# Patient Record
Sex: FEMALE | Race: WHITE | NOT HISPANIC OR LATINO | Employment: FULL TIME | URBAN - METROPOLITAN AREA
[De-identification: names, ages, dates, MRNs, and addresses within clinical notes are randomized per-mention and may not be internally consistent; named-entity substitution may affect disease eponyms.]

---

## 2017-01-23 ENCOUNTER — ALLSCRIPTS OFFICE VISIT (OUTPATIENT)
Dept: OTHER | Facility: OTHER | Age: 32
End: 2017-01-23

## 2017-07-21 ENCOUNTER — ALLSCRIPTS OFFICE VISIT (OUTPATIENT)
Dept: OTHER | Facility: OTHER | Age: 32
End: 2017-07-21

## 2017-07-21 PROCEDURE — G0145 SCR C/V CYTO,THINLAYER,RESCR: HCPCS | Performed by: FAMILY MEDICINE

## 2017-07-21 PROCEDURE — 87624 HPV HI-RISK TYP POOLED RSLT: CPT | Performed by: FAMILY MEDICINE

## 2017-07-23 ENCOUNTER — LAB REQUISITION (OUTPATIENT)
Dept: LAB | Facility: HOSPITAL | Age: 32
End: 2017-07-23
Payer: COMMERCIAL

## 2017-07-23 DIAGNOSIS — Z01.419 ENCOUNTER FOR GYNECOLOGICAL EXAMINATION WITHOUT ABNORMAL FINDING: ICD-10-CM

## 2017-07-26 LAB
HPV RRNA GENITAL QL NAA+PROBE: NORMAL
LAB AP GYN PRIMARY INTERPRETATION: NORMAL
Lab: NORMAL

## 2017-07-27 ENCOUNTER — GENERIC CONVERSION - ENCOUNTER (OUTPATIENT)
Dept: OTHER | Facility: OTHER | Age: 32
End: 2017-07-27

## 2017-12-22 ENCOUNTER — ALLSCRIPTS OFFICE VISIT (OUTPATIENT)
Dept: OTHER | Facility: OTHER | Age: 32
End: 2017-12-22

## 2017-12-23 NOTE — PROGRESS NOTES
Assessment   1  ADHD (attention deficit hyperactivity disorder) (314 01) (F90 9)    Plan   ADHD (attention deficit hyperactivity disorder)    · Amphetamine-Dextroamphetamine 30 MG Oral Tablet (Adderall); TAKE 1    TABLET DAILY AS DIRECTED    Discussion/Summary      Same justyn, no change at this time  Possible side effects of new medications were reviewed with the patient/guardian today  The treatment plan was reviewed with the patient/guardian  The patient/guardian understands and agrees with the treatment plan      Chief Complaint   Pt here for 6 month follow up      History of Present Illness   The patient is being seen for a routine clinic follow-up of and taking gingko which helped a little with focus attention deficit hyperactivity disorder  Symptoms:  restlessness-- and-- more irritable, but-- no short attention span,-- no forgetfulness-- and-- no disorganization  Associated symptoms:  anxiety  Review of Systems        Constitutional: not feeling poorly-- and-- not feeling tired  Cardiovascular: no chest pain  Respiratory: no shortness of breath  Neurological: no headache  Psychiatric: anxiety, but-- no sleep disturbances  Active Problems   1  ADHD (attention deficit hyperactivity disorder) (314 01) (F90 9)   2  Lipoma (214 9) (D17 9)   3  Migraine (346 90) (G43 909)   4  Urticaria (708 9) (L50 9)    Past Medical History      The active problems and past medical history were reviewed and updated today  Surgical History   1  History of Oral Surgery Tooth Extraction     The surgical history was reviewed and updated today  Family History   Mother    1  Family history of attention deficit hyperactivity disorder (V17 0) (Z81 8)  Father    2  Family history of alcohol abuse (V61 41) (Z81 1)  Brother    3  Family history of Drug abuse   4  Family history of alcohol abuse (V61 41) (Z81 1)   5  Family history of attention deficit hyperactivity disorder (V17 0) (Z81 8)   6  Family history of substance abuse (V17 0) (Z81 4)  Grandfather    7  Family history of malignant neoplasm (V16 9) (Z80 9)  Maternal Aunt    8  Family history of multiple sclerosis (V17 2) (Z82 0)  Uncle    9  Family history of alcohol abuse (V61 41) (Z81 1)   10  Family history of substance abuse (V17 0) (Z81 4)     The family history was reviewed and updated today  Social History    · Alcohol consumption two to four days per week (V49 89) (Z78 9)   · Always uses seat belt   · Caffeine use (V49 89) (F15 90)   · Employed   · Full-time employment   · Has no children   · Has Spiritism belief   · Denied: History of domestic violence   · Lives in private house   · Living alone (V60 3) (Z60 2)   · Never smoker   · No advance directives (V49 89) (Z78 9)   · No alcohol use   · No living will   · Primary language is [place]   · Sexual History: Has a single partner   · Single   · Supportive and safe  The social history was reviewed and updated today  Current Meds    1  Amphetamine-Dextroamphetamine 30 MG Oral Tablet; TAKE 1 TABLET DAILY AS     DIRECTED; Last Rx:30Nov2017 Ordered     The medication list was reviewed and updated today  Allergies   1  Iodinated Contrast Media  2  No Known Environmental Allergies   3  No Known Food Allergies    Vitals   Vital Signs    Recorded: [id number] 09:00AM   Temperature 97 5 F, Temporal   Heart Rate 78   Respiration 14   Systolic 827, Sitting   Diastolic 70, Sitting   Height 5 ft 6 in   Weight 165 lb 6 oz   BMI Calculated 26 69   BSA Calculated 1 84     Physical Exam        Constitutional      General appearance: No acute distress, well appearing and well nourished  Pulmonary      Auscultation of lungs: Clear to auscultation  Cardiovascular      Auscultation of heart: Normal rate and rhythm, normal S1 and S2, without murmurs  Lymphatic      Palpation of lymph nodes in neck: No lymphadenopathy         Psychiatric      Mood and affect: Normal  Signatures    Electronically signed by :  Marky Benitez MD; Dec 22 2017  9:22AM EST                       (Author)

## 2018-01-12 VITALS
WEIGHT: 183 LBS | HEIGHT: 66 IN | HEART RATE: 76 BPM | BODY MASS INDEX: 29.41 KG/M2 | DIASTOLIC BLOOD PRESSURE: 80 MMHG | SYSTOLIC BLOOD PRESSURE: 122 MMHG

## 2018-01-12 NOTE — RESULT NOTES
Verified Results  (1) THIN PREP PAP WITH IMAGING [id number] 02:49PM Naahi Finger     Test Name Result Flag Reference   LAB AP CASE REPORT (Report)     Gynecologic Cytology Report            Case: [id number]                  Authorizing Provider: Neha Mallory MD      Collected:      07/21/2017           First Screen:     MARYLIN Zavala   Received:      07/24/2017 1534        Specimen:  LIQUID-BASED PAP, SCREENING, Endocervical   HPV HIGH RISK RESULT (Report)     HPV, High Risk: HPV NEG, HPV16 NEG, HPV18 NEG      Other High Risk HPV Negative, HPV 16 Negative, HPV 18 Negative  HPV types: 16,18,31,33,35,39,45,51,52,56,58,59,66 and 68 DNA are undetectable or below the pre-set threshold  Roche???s FDA approved Griselda 4800 is utilized with strict adherence to the ???s instruction  manual to test for the presence of High-Risk HPV DNA, as well as HPV 16 and HPV 18  This instrument  has been validated by our laboratory and/or by the   A negative result does not preclude the presence of HPV infection because results depend on adequate  specimen collection, absence of inhibitors and sufficient DNA to be detected  Additionally, HPV negative  results are not intended to prevent women from proceeding to colposcopy if clinically warranted  Positive HPV test results indicate the presence of any one or more of the high risk types, but since patients  are often co-infected with low-risk types it does not rule out the presence of low-risk types in patients  with mixed infections  LAB AP GYN PRIMARY INTERPRETATION      Negative for intraepithelial lesion or malignancy  Electronically signed by MARYLIN Zavala on 7/26/2017 at 2:49 PM   LAB AP GYN SPECIMEN ADEQUACY      Satisfactory for evaluation  Endocervical/transformation zone component present     LAB AP GYN ADDITIONAL INFORMATION (Report)     Attributor's FDA approved ,  and ThinPrep Imaging System are   utilized with strict adherence to the 's instruction manual to   prepare gynecologic and non-gynecologic cytology specimens for the   production of ThinPrep slides as well as for gynecologic ThinPrep imaging  These processes have been validated by our laboratory and/or by the     The Pap test is not a diagnostic procedure and should not be used as the   sole means to detect cervical cancer  It is only a screening procedure to   aid in the detection of cervical cancer and its precursors  Both   false-negative and false-positive results have been experienced  Your   patient's test result should be interpreted in this context together with   the history and clinical findings

## 2018-01-14 VITALS
HEIGHT: 66 IN | DIASTOLIC BLOOD PRESSURE: 80 MMHG | TEMPERATURE: 97.7 F | SYSTOLIC BLOOD PRESSURE: 116 MMHG | HEART RATE: 77 BPM | WEIGHT: 162.8 LBS | BODY MASS INDEX: 26.16 KG/M2 | RESPIRATION RATE: 16 BRPM

## 2018-01-22 VITALS
BODY MASS INDEX: 26.58 KG/M2 | DIASTOLIC BLOOD PRESSURE: 70 MMHG | RESPIRATION RATE: 14 BRPM | HEIGHT: 66 IN | HEART RATE: 78 BPM | WEIGHT: 165.38 LBS | TEMPERATURE: 97.5 F | SYSTOLIC BLOOD PRESSURE: 110 MMHG

## 2018-03-02 DIAGNOSIS — F90.9 ATTENTION DEFICIT HYPERACTIVITY DISORDER (ADHD), UNSPECIFIED ADHD TYPE: Primary | ICD-10-CM

## 2018-03-02 RX ORDER — DEXTROAMPHETAMINE SACCHARATE, AMPHETAMINE ASPARTATE, DEXTROAMPHETAMINE SULFATE AND AMPHETAMINE SULFATE 7.5; 7.5; 7.5; 7.5 MG/1; MG/1; MG/1; MG/1
1 TABLET ORAL DAILY
Qty: 30 TABLET | Refills: 0 | Status: SHIPPED | OUTPATIENT
Start: 2018-03-02 | End: 2018-04-18 | Stop reason: SDUPTHER

## 2018-03-02 RX ORDER — DEXTROAMPHETAMINE SACCHARATE, AMPHETAMINE ASPARTATE, DEXTROAMPHETAMINE SULFATE AND AMPHETAMINE SULFATE 7.5; 7.5; 7.5; 7.5 MG/1; MG/1; MG/1; MG/1
1 TABLET ORAL DAILY
COMMUNITY
End: 2018-03-02 | Stop reason: SDUPTHER

## 2018-03-06 ENCOUNTER — TELEPHONE (OUTPATIENT)
Dept: FAMILY MEDICINE CLINIC | Facility: CLINIC | Age: 33
End: 2018-03-06

## 2018-03-09 ENCOUNTER — TELEPHONE (OUTPATIENT)
Dept: FAMILY MEDICINE CLINIC | Facility: CLINIC | Age: 33
End: 2018-03-09

## 2018-04-18 DIAGNOSIS — F90.9 ATTENTION DEFICIT HYPERACTIVITY DISORDER (ADHD), UNSPECIFIED ADHD TYPE: ICD-10-CM

## 2018-04-18 RX ORDER — DEXTROAMPHETAMINE SACCHARATE, AMPHETAMINE ASPARTATE, DEXTROAMPHETAMINE SULFATE AND AMPHETAMINE SULFATE 7.5; 7.5; 7.5; 7.5 MG/1; MG/1; MG/1; MG/1
1 TABLET ORAL DAILY
Qty: 30 TABLET | Refills: 0 | Status: SHIPPED | OUTPATIENT
Start: 2018-04-18 | End: 2018-07-02 | Stop reason: SDUPTHER

## 2018-05-04 ENCOUNTER — TELEPHONE (OUTPATIENT)
Dept: FAMILY MEDICINE CLINIC | Facility: CLINIC | Age: 33
End: 2018-05-04

## 2018-05-04 NOTE — TELEPHONE ENCOUNTER
Pt  Is leaving for [place] on the 8th and not returning to the 28th and will run out of her Adderall on the 20th wondering what she can do because she doesn't want to go eight days without the Rx   She said we can leave a message if she doesn't

## 2018-07-02 ENCOUNTER — OFFICE VISIT (OUTPATIENT)
Dept: FAMILY MEDICINE CLINIC | Facility: CLINIC | Age: 33
End: 2018-07-02
Payer: COMMERCIAL

## 2018-07-02 VITALS
TEMPERATURE: 98 F | DIASTOLIC BLOOD PRESSURE: 68 MMHG | BODY MASS INDEX: 27.23 KG/M2 | RESPIRATION RATE: 18 BRPM | HEIGHT: 66 IN | WEIGHT: 169.4 LBS | SYSTOLIC BLOOD PRESSURE: 112 MMHG | HEART RATE: 77 BPM

## 2018-07-02 DIAGNOSIS — D17.21 LIPOMA OF RIGHT UPPER EXTREMITY: ICD-10-CM

## 2018-07-02 DIAGNOSIS — F90.9 ATTENTION DEFICIT HYPERACTIVITY DISORDER (ADHD), UNSPECIFIED ADHD TYPE: ICD-10-CM

## 2018-07-02 DIAGNOSIS — S60.511D ABRASION OF RIGHT HAND, SUBSEQUENT ENCOUNTER: ICD-10-CM

## 2018-07-02 DIAGNOSIS — N83.201 CYST OF RIGHT OVARY: Primary | ICD-10-CM

## 2018-07-02 PROCEDURE — 90715 TDAP VACCINE 7 YRS/> IM: CPT

## 2018-07-02 PROCEDURE — 99213 OFFICE O/P EST LOW 20 MIN: CPT | Performed by: FAMILY MEDICINE

## 2018-07-02 PROCEDURE — 90471 IMMUNIZATION ADMIN: CPT

## 2018-07-02 RX ORDER — DEXTROAMPHETAMINE SACCHARATE, AMPHETAMINE ASPARTATE, DEXTROAMPHETAMINE SULFATE AND AMPHETAMINE SULFATE 7.5; 7.5; 7.5; 7.5 MG/1; MG/1; MG/1; MG/1
1 TABLET ORAL DAILY
Qty: 30 TABLET | Refills: 0 | Status: SHIPPED | OUTPATIENT
Start: 2018-07-02 | End: 2018-08-02 | Stop reason: SDUPTHER

## 2018-07-02 RX ORDER — NAPROXEN 500 MG/1
500 TABLET ORAL 2 TIMES DAILY WITH MEALS
Qty: 30 TABLET | Refills: 1 | Status: ON HOLD | OUTPATIENT
Start: 2018-07-02 | End: 2018-08-09 | Stop reason: ALTCHOICE

## 2018-07-02 NOTE — PROGRESS NOTES
Assessment/Plan:    ADHD (attention deficit hyperactivity disorder)  stable on meds       Diagnoses and all orders for this visit:    Cyst of right ovary  -     naproxen (EC NAPROSYN) 500 MG EC tablet; Take 1 tablet (500 mg total) by mouth 2 (two) times a day with meals    Attention deficit hyperactivity disorder (ADHD), unspecified ADHD type  -     amphetamine-dextroamphetamine (ADDERALL) 30 MG tablet; Take 1 tablet (30 mg total) by mouth daily for 30 days Max Daily Amount: 30 mg    Lipoma of right upper extremity  -     Ambulatory referral to General Surgery; Future          Subjective:      Patient ID: Simeon Flanagan is a 27 y o  female  F/u add, looking at shift in type of work she is doing, interviewed last week, does not feel she need change in meds at this time, recently engaged-date 4/13/19        The following portions of the patient's history were reviewed and updated as appropriate: allergies, current medications, past family history, past medical history, past social history, past surgical history and problem list       Review of Systems   Constitutional: Negative for activity change, appetite change and unexpected weight change  Respiratory: Negative for shortness of breath  Cardiovascular: Negative for chest pain and palpitations  Gastrointestinal: Positive for abdominal pain  Had ovarian cyst last week that brought on menses 5 days early   Skin: Positive for wound  Dog nipped her and had abrasion   Neurological: Negative for dizziness and headaches  Psychiatric/Behavioral:        Focusing stable         Objective:      /68 (BP Location: Left arm, Patient Position: Sitting, Cuff Size: Adult)   Pulse 77   Temp 98 °F (36 7 °C) (Temporal)   Resp 18   Ht 5' 6" (1 676 m)   Wt 76 8 kg (169 lb 6 4 oz)   LMP 06/24/2018   Breastfeeding? No   BMI 27 34 kg/m²          Physical Exam   Constitutional: She appears well-developed and well-nourished     Neck: No thyromegaly present  Cardiovascular: Normal rate, regular rhythm and normal heart sounds  Pulmonary/Chest: Breath sounds normal    Abdominal: Soft  Bowel sounds are normal    Lymphadenopathy:     She has no cervical adenopathy

## 2018-07-11 RX ORDER — CYCLOBENZAPRINE HCL 10 MG
TABLET ORAL
Status: ON HOLD | COMMUNITY
End: 2018-08-09 | Stop reason: ALTCHOICE

## 2018-07-11 RX ORDER — DIPHENHYDRAMINE HCL 50 MG
CAPSULE ORAL
COMMUNITY
End: 2021-08-04

## 2018-07-11 RX ORDER — TIZANIDINE 4 MG/1
TABLET ORAL
Status: ON HOLD | COMMUNITY
End: 2018-08-09 | Stop reason: ALTCHOICE

## 2018-07-11 RX ORDER — DULOXETIN HYDROCHLORIDE 30 MG/1
CAPSULE, DELAYED RELEASE ORAL
Status: ON HOLD | COMMUNITY
End: 2018-08-09 | Stop reason: ALTCHOICE

## 2018-07-11 RX ORDER — OXYCODONE HYDROCHLORIDE AND ACETAMINOPHEN 5; 325 MG/1; MG/1
TABLET ORAL
Status: ON HOLD | COMMUNITY
End: 2018-08-09 | Stop reason: ALTCHOICE

## 2018-07-11 RX ORDER — DIAZEPAM 10 MG/1
TABLET ORAL
Status: ON HOLD | COMMUNITY
End: 2018-08-09 | Stop reason: ALTCHOICE

## 2018-07-11 RX ORDER — ROPINIROLE 0.25 MG/1
TABLET, FILM COATED ORAL
Status: ON HOLD | COMMUNITY
End: 2018-08-09 | Stop reason: ALTCHOICE

## 2018-07-11 RX ORDER — GABAPENTIN 100 MG/1
CAPSULE ORAL
Status: ON HOLD | COMMUNITY
End: 2018-08-09 | Stop reason: ALTCHOICE

## 2018-07-11 RX ORDER — OXYCODONE HYDROCHLORIDE 10 MG/1
TABLET ORAL
Status: ON HOLD | COMMUNITY
End: 2018-08-09 | Stop reason: ALTCHOICE

## 2018-07-11 RX ORDER — METHYLPREDNISOLONE 4 MG/1
TABLET ORAL
Status: ON HOLD | COMMUNITY
End: 2018-08-09 | Stop reason: ALTCHOICE

## 2018-07-11 RX ORDER — CYCLOBENZAPRINE HCL 5 MG
TABLET ORAL
Status: ON HOLD | COMMUNITY
End: 2018-08-09 | Stop reason: ALTCHOICE

## 2018-07-11 RX ORDER — PREDNISONE 50 MG/1
TABLET ORAL
Status: ON HOLD | COMMUNITY
End: 2018-08-09 | Stop reason: ALTCHOICE

## 2018-07-12 ENCOUNTER — OFFICE VISIT (OUTPATIENT)
Dept: SURGERY | Facility: CLINIC | Age: 33
End: 2018-07-12
Payer: COMMERCIAL

## 2018-07-12 VITALS
SYSTOLIC BLOOD PRESSURE: 116 MMHG | RESPIRATION RATE: 18 BRPM | TEMPERATURE: 98.2 F | BODY MASS INDEX: 26.68 KG/M2 | HEIGHT: 66 IN | WEIGHT: 166 LBS | HEART RATE: 86 BPM | DIASTOLIC BLOOD PRESSURE: 78 MMHG

## 2018-07-12 DIAGNOSIS — D17.21 LIPOMA OF RIGHT UPPER EXTREMITY: ICD-10-CM

## 2018-07-12 DIAGNOSIS — M79.9 SOFT TISSUE LESION: Primary | ICD-10-CM

## 2018-07-12 PROCEDURE — 99243 OFF/OP CNSLTJ NEW/EST LOW 30: CPT | Performed by: SURGERY

## 2018-07-12 NOTE — PROGRESS NOTES
Assessment/Plan:   Tayla Hickey is a 27 y  o female who is here for The encounter diagnosis was Lipoma of right upper extremity  On exam found to have Lipoma of the upper extremity  Plan: Excise lesion(s) under anesthesia in the operating room        - Patient has been instructed to avoid herbs or non-directed vitamins the week prior to surgery to ensure no drug interactions with perioperative surgical and anesthetic medications  - Patient should continue beta-blocker medication up through and including the day of surgery but hold any other hypertensive medications, including diuretics, unless instructed by PCP or anesthesia  - Patient should continue his statin medication up through and including the day of surgery   - Hold metformin , If on this medication, the morning of surgery and do not resume until 48 hours AFTER surgery to avoid risk of lactic acidosis  Do not resume if eGFR is < 30  - Insulin Management:If on Insulin, patient advised to call PCP for explicit instructions  In general, will need to take one-half normal dose am of surgery but pt advised to consult PCP before making any changes  - Patient has been instructed to avoid aspirin containing medications or non-steroidal anti-inflammatory drugs for SEVEN days preceding surgery  Preoperative Clearance: None          _______________________________________________________  CC: Mass (Right upper are )    HPI:  Tayla Hickey is a 27 y  o female who was referred for evaluation of Mass (Right upper are )    Currently patient reports FIVE YEAR HISTORY OF INCREASING SIZE OF A RIGHT POSTERIOR DELTOID LIPOMA NOW MEASURING AT LEAST 10 CM  Lynita Ped      Reports: growing    ROS:  General ROS: negative  negative for - chills, fatigue, fever or night sweats, weight loss  Respiratory ROS: no cough, shortness of breath, or wheezing  Cardiovascular ROS: no chest pain or dyspnea on exertion  Genito-Urinary ROS: no dysuria, trouble voiding, or hematuria  Musculoskeletal ROS: negative for - gait disturbance, joint pain or muscle pain  Neurological ROS: no TIA or stroke symptoms  Skin ROS: See HPI  GI ROS: see HPI  Skin ROS: no new rashes or lesions   Lymphatic ROS: no new adenopathy noted by pt     GYN ROS: see HPI, no new GYN history or bleeding noted  Psy ROS: no new mental or behavioral disturbances       Patient Active Problem List   Diagnosis    ADHD (attention deficit hyperactivity disorder)    Migraine    Urticaria         Allergies:  Iodinated diagnostic agents and Iodine      Current Outpatient Prescriptions:     amphetamine-dextroamphetamine (ADDERALL) 30 MG tablet, Take 1 tablet (30 mg total) by mouth daily for 30 days Max Daily Amount: 30 mg, Disp: 30 tablet, Rfl: 0    Amphetamine-Dextroamphetamine (AMPHETAMINE SALT COMBO PO), Amphetamine Salt Combo 30 mg tablet, Disp: , Rfl:     celecoxib (CELEBREX) 200 mg capsule, Celebrex 200 mg capsule, Disp: , Rfl:     cyclobenzaprine (FLEXERIL) 10 mg tablet, cyclobenzaprine 10 mg tablet, Disp: , Rfl:     cyclobenzaprine (FLEXERIL) 5 mg tablet, cyclobenzaprine 5 mg tablet, Disp: , Rfl:     diazepam (VALIUM) 10 mg tablet, diazepam 10 mg tablet, Disp: , Rfl:     diphenhydrAMINE (BENADRYL) 50 mg capsule, diphenhydramine 50 mg capsule, Disp: , Rfl:     DULoxetine (CYMBALTA) 30 mg delayed release capsule, duloxetine 30 mg capsule,delayed release, Disp: , Rfl:     gabapentin (NEURONTIN) 100 mg capsule, gabapentin 100 mg capsule, Disp: , Rfl:     Methylprednisolone 4 MG TBPK, methylprednisolone 4 mg tablets in a dose pack, Disp: , Rfl:     naproxen (EC NAPROSYN) 500 MG EC tablet, Take 1 tablet (500 mg total) by mouth 2 (two) times a day with meals, Disp: 30 tablet, Rfl: 1    oxyCODONE (ROXICODONE) 10 MG TABS, oxycodone 10 mg tablet, Disp: , Rfl:     oxyCODONE-acetaminophen (PERCOCET) 5-325 mg per tablet, oxycodone-acetaminophen 5 mg-325 mg tablet, Disp: , Rfl:     predniSONE 50 mg tablet, prednisone 50 mg tablet, Disp: , Rfl:     rOPINIRole (REQUIP) 0 25 mg tablet, ropinirole 0 25 mg tablet, Disp: , Rfl:     tiZANidine (ZANAFLEX) 4 mg tablet, tizanidine 4 mg tablet, Disp: , Rfl:     Past Medical History:   Diagnosis Date    ADHD     Lipoma     Migraine        Past Surgical History:   Procedure Laterality Date    TOOTH EXTRACTION         Family History   Problem Relation Age of Onset   [facility] ADD / ADHD Mother     Alcohol abuse Father     Substance Abuse Brother     Alcohol abuse Brother     ADD / ADHD Brother     Multiple sclerosis Maternal Aunt     Alcohol abuse Family     Substance Abuse Family     Cancer Family         reports that she has never smoked  She has never used smokeless tobacco  She reports that she drinks alcohol  She reports that she does not use drugs  PHYSICAL EXAM  General Appearance:    Alert, cooperative, no distress,    Head:    Normocephalic without obvious abnormality   Eyes:    PERRL, conjunctiva/corneas clear, EOM's intact        Neck:   Supple, no adenopathy, no JVD   Back:     Symmetric, no spinal or CVA tenderness   Lungs:     Clear to auscultation bilaterally, no wheezing or rhonchi   Heart:    Regular rate and rhythm, S1 and S2 normal, no murmur   Abdomen:     Benign, no rebound or guarding  Extremities:   Extremities normal  No clubbing, cyanosis or edema   Psych:   Normal Affect, AOx3  Neurologic:  Skin:   CNII-XII intact  Strength symmetric, speech intact    Warm, dry, intact, no visible rashes or lesions except as follows:   Right posterior deltoid upper arm 10 cm lipoma, bulging out  No neurovascular symptoms in the lower part of the arm  Some portions of this record may have been generated with voice recognition software  There may be translation, syntax,  or grammatical errors  Occasional wrong word or "sound-a-like" substitutions may have occurred due to the inherent limitations of the voice recognition software   Read the chart carefully and recognize, using context, where substitutions may have occurred  If you have any questions, please contact the dictating provider for clarification or correction, as needed  This encounter has been coded by a non-certified coder         Rexann Klinefelter, MD    Date: 7/12/2018 Time: 2:06 PM

## 2018-07-12 NOTE — LETTER
July 12, 2018     Diogenes Hernandez MD  [address]    Patient: Finn Austin   YOB: 1985   Date of Visit: 7/12/2018       Dear Dr Rodney Rehman:    Thank you for referring Johan Joen to me for evaluation  Below are my notes for this consultation  If you have questions, please do not hesitate to call me  I look forward to following your patient along with you  Sincerely,        Jonathan Avitia MD        CC: No Recipients  Jonathan Avitia MD  7/12/2018  2:12 PM  Sign at close encounter  Assessment/Plan:   Finn Austin is a 27 y  o female who is here for The encounter diagnosis was Lipoma of right upper extremity  On exam found to have Lipoma of the upper extremity  Plan: Excise lesion(s) under anesthesia in the operating room        - Patient has been instructed to avoid herbs or non-directed vitamins the week prior to surgery to ensure no drug interactions with perioperative surgical and anesthetic medications  - Patient should continue beta-blocker medication up through and including the day of surgery but hold any other hypertensive medications, including diuretics, unless instructed by PCP or anesthesia  - Patient should continue his statin medication up through and including the day of surgery   - Hold metformin , If on this medication, the morning of surgery and do not resume until 48 hours AFTER surgery to avoid risk of lactic acidosis  Do not resume if eGFR is < 30  - Insulin Management:If on Insulin, patient advised to call PCP for explicit instructions  In general, will need to take one-half normal dose am of surgery but pt advised to consult PCP before making any changes  - Patient has been instructed to avoid aspirin containing medications or non-steroidal anti-inflammatory drugs for SEVEN days preceding surgery  Preoperative Clearance: None          _______________________________________________________  CC: Mass (Right upper are )    HPI:  Tayla Hickey is a 27 y  o female who was referred for evaluation of Mass (Right upper are )    Currently patient reports FIVE YEAR HISTORY OF INCREASING SIZE OF A RIGHT POSTERIOR DELTOID LIPOMA NOW MEASURING AT LEAST 10 CM  Lynita Ped Reports: growing    ROS:  General ROS: negative  negative for - chills, fatigue, fever or night sweats, weight loss  Respiratory ROS: no cough, shortness of breath, or wheezing  Cardiovascular ROS: no chest pain or dyspnea on exertion  Genito-Urinary ROS: no dysuria, trouble voiding, or hematuria  Musculoskeletal ROS: negative for - gait disturbance, joint pain or muscle pain  Neurological ROS: no TIA or stroke symptoms  Skin ROS: See HPI  GI ROS: see HPI  Skin ROS: no new rashes or lesions   Lymphatic ROS: no new adenopathy noted by pt     GYN ROS: see HPI, no new GYN history or bleeding noted  Psy ROS: no new mental or behavioral disturbances       Patient Active Problem List   Diagnosis    ADHD (attention deficit hyperactivity disorder)    Migraine    Urticaria         Allergies:  Iodinated diagnostic agents and Iodine      Current Outpatient Prescriptions:     amphetamine-dextroamphetamine (ADDERALL) 30 MG tablet, Take 1 tablet (30 mg total) by mouth daily for 30 days Max Daily Amount: 30 mg, Disp: 30 tablet, Rfl: 0    Amphetamine-Dextroamphetamine (AMPHETAMINE SALT COMBO PO), Amphetamine Salt Combo 30 mg tablet, Disp: , Rfl:     celecoxib (CELEBREX) 200 mg capsule, Celebrex 200 mg capsule, Disp: , Rfl:     cyclobenzaprine (FLEXERIL) 10 mg tablet, cyclobenzaprine 10 mg tablet, Disp: , Rfl:     cyclobenzaprine (FLEXERIL) 5 mg tablet, cyclobenzaprine 5 mg tablet, Disp: , Rfl:     diazepam (VALIUM) 10 mg tablet, diazepam 10 mg tablet, Disp: , Rfl:     diphenhydrAMINE (BENADRYL) 50 mg capsule, diphenhydramine 50 mg capsule, Disp: , Rfl:     DULoxetine (CYMBALTA) 30 mg delayed release capsule, duloxetine 30 mg capsule,delayed release, Disp: , Rfl:    gabapentin (NEURONTIN) 100 mg capsule, gabapentin 100 mg capsule, Disp: , Rfl:     Methylprednisolone 4 MG TBPK, methylprednisolone 4 mg tablets in a dose pack, Disp: , Rfl:     naproxen (EC NAPROSYN) 500 MG EC tablet, Take 1 tablet (500 mg total) by mouth 2 (two) times a day with meals, Disp: 30 tablet, Rfl: 1    oxyCODONE (ROXICODONE) 10 MG TABS, oxycodone 10 mg tablet, Disp: , Rfl:     oxyCODONE-acetaminophen (PERCOCET) 5-325 mg per tablet, oxycodone-acetaminophen 5 mg-325 mg tablet, Disp: , Rfl:     predniSONE 50 mg tablet, prednisone 50 mg tablet, Disp: , Rfl:     rOPINIRole (REQUIP) 0 25 mg tablet, ropinirole 0 25 mg tablet, Disp: , Rfl:     tiZANidine (ZANAFLEX) 4 mg tablet, tizanidine 4 mg tablet, Disp: , Rfl:     Past Medical History:   Diagnosis Date    ADHD     Lipoma     Migraine        Past Surgical History:   Procedure Laterality Date    TOOTH EXTRACTION         Family History   Problem Relation Age of Onset    ADD / ADHD Mother     Alcohol abuse Father     Substance Abuse Brother     Alcohol abuse Brother     ADD / ADHD Brother     Multiple sclerosis Maternal Aunt     Alcohol abuse Family     Substance Abuse Family     Cancer Family         reports that she has never smoked  She has never used smokeless tobacco  She reports that she drinks alcohol  She reports that she does not use drugs  PHYSICAL EXAM  General Appearance:    Alert, cooperative, no distress,    Head:    Normocephalic without obvious abnormality   Eyes:    PERRL, conjunctiva/corneas clear, EOM's intact        Neck:   Supple, no adenopathy, no JVD   Back:     Symmetric, no spinal or CVA tenderness   Lungs:     Clear to auscultation bilaterally, no wheezing or rhonchi   Heart:    Regular rate and rhythm, S1 and S2 normal, no murmur   Abdomen:     Benign, no rebound or guarding  Extremities:   Extremities normal  No clubbing, cyanosis or edema   Psych:   Normal Affect, AOx3      Neurologic:  Skin:   CNII-XII intact  Strength symmetric, speech intact    Warm, dry, intact, no visible rashes or lesions except as follows:   Right posterior deltoid upper arm 10 cm lipoma, bulging out  No neurovascular symptoms in the lower part of the arm  Some portions of this record may have been generated with voice recognition software  There may be translation, syntax,  or grammatical errors  Occasional wrong word or "sound-a-like" substitutions may have occurred due to the inherent limitations of the voice recognition software  Read the chart carefully and recognize, using context, where substitutions may have occurred  If you have any questions, please contact the dictating provider for clarification or correction, as needed  This encounter has been coded by a non-certified coder         Denise Carroll MD    Date: 7/12/2018 Time: 2:06 PM

## 2018-07-13 PROBLEM — M79.89 SOFT TISSUE MASS: Status: ACTIVE | Noted: 2018-07-13

## 2018-08-02 DIAGNOSIS — F90.9 ATTENTION DEFICIT HYPERACTIVITY DISORDER (ADHD), UNSPECIFIED ADHD TYPE: ICD-10-CM

## 2018-08-02 RX ORDER — DEXTROAMPHETAMINE SACCHARATE, AMPHETAMINE ASPARTATE, DEXTROAMPHETAMINE SULFATE AND AMPHETAMINE SULFATE 7.5; 7.5; 7.5; 7.5 MG/1; MG/1; MG/1; MG/1
1 TABLET ORAL DAILY
Qty: 30 TABLET | Refills: 0 | Status: SHIPPED | OUTPATIENT
Start: 2018-08-02 | End: 2018-09-06 | Stop reason: SDUPTHER

## 2018-08-02 RX ORDER — DEXTROAMPHETAMINE SACCHARATE, AMPHETAMINE ASPARTATE, DEXTROAMPHETAMINE SULFATE AND AMPHETAMINE SULFATE 7.5; 7.5; 7.5; 7.5 MG/1; MG/1; MG/1; MG/1
1 TABLET ORAL DAILY
Qty: 30 TABLET | Refills: 0 | Status: SHIPPED | OUTPATIENT
Start: 2018-08-02 | End: 2018-08-02 | Stop reason: SDUPTHER

## 2018-08-02 NOTE — TELEPHONE ENCOUNTER
Pt called for med refill for Adderall to be [facility] to [facility]  Pt last seen 7/2/18, last BW 7/21/17

## 2018-08-03 ENCOUNTER — TELEPHONE (OUTPATIENT)
Dept: FAMILY MEDICINE CLINIC | Facility: CLINIC | Age: 33
End: 2018-08-03

## 2018-08-03 DIAGNOSIS — D17.20 LIPOMA OF UPPER EXTREMITY, UNSPECIFIED LATERALITY: Primary | ICD-10-CM

## 2018-08-03 NOTE — TELEPHONE ENCOUNTER
General referral to general surgery done-did not pick particular provider so she can go wherever insurance prefers-DR Silvano Calderon is breast surgeon so probably not best choice

## 2018-08-03 NOTE — TELEPHONE ENCOUNTER
Pt called in and stated her insurance doesn't  Cover for her to see Dr Ynes Parr , pt will like to know if you can generate a new script for Dr Janny Dillard, Carlos Forrest, and Eyad Plaza that way she can call around to see who has availability first to see her please advise

## 2018-08-08 ENCOUNTER — ANESTHESIA EVENT (OUTPATIENT)
Dept: PERIOP | Facility: HOSPITAL | Age: 33
End: 2018-08-08
Payer: COMMERCIAL

## 2018-08-09 ENCOUNTER — ANESTHESIA (OUTPATIENT)
Dept: PERIOP | Facility: HOSPITAL | Age: 33
End: 2018-08-09
Payer: COMMERCIAL

## 2018-08-09 ENCOUNTER — HOSPITAL ENCOUNTER (OUTPATIENT)
Facility: HOSPITAL | Age: 33
Setting detail: OUTPATIENT SURGERY
Discharge: HOME | End: 2018-08-09
Attending: SURGERY | Admitting: SURGERY
Payer: COMMERCIAL

## 2018-08-09 VITALS
SYSTOLIC BLOOD PRESSURE: 105 MMHG | WEIGHT: 166 LBS | BODY MASS INDEX: 26.68 KG/M2 | DIASTOLIC BLOOD PRESSURE: 71 MMHG | HEIGHT: 66 IN | HEART RATE: 72 BPM | TEMPERATURE: 98.2 F | OXYGEN SATURATION: 100 % | RESPIRATION RATE: 18 BRPM

## 2018-08-09 DIAGNOSIS — M79.89 SOFT TISSUE MASS: Primary | ICD-10-CM

## 2018-08-09 LAB — EXT PREGNANCY TEST URINE: NEGATIVE

## 2018-08-09 PROCEDURE — 81025 URINE PREGNANCY TEST: CPT | Performed by: ANESTHESIOLOGY

## 2018-08-09 PROCEDURE — 24071 EXC ARM/ELBOW LES SC 3 CM/>: CPT | Performed by: SURGERY

## 2018-08-09 PROCEDURE — 88304 TISSUE EXAM BY PATHOLOGIST: CPT | Performed by: PATHOLOGY

## 2018-08-09 RX ORDER — MIDAZOLAM HYDROCHLORIDE 1 MG/ML
INJECTION INTRAMUSCULAR; INTRAVENOUS AS NEEDED
Status: DISCONTINUED | OUTPATIENT
Start: 2018-08-09 | End: 2018-08-09 | Stop reason: SURG

## 2018-08-09 RX ORDER — ONDANSETRON 2 MG/ML
4 INJECTION INTRAMUSCULAR; INTRAVENOUS ONCE AS NEEDED
Status: DISCONTINUED | OUTPATIENT
Start: 2018-08-09 | End: 2018-08-09 | Stop reason: HOSPADM

## 2018-08-09 RX ORDER — ONDANSETRON 2 MG/ML
INJECTION INTRAMUSCULAR; INTRAVENOUS AS NEEDED
Status: DISCONTINUED | OUTPATIENT
Start: 2018-08-09 | End: 2018-08-09 | Stop reason: SURG

## 2018-08-09 RX ORDER — MEPERIDINE HYDROCHLORIDE 50 MG/ML
12.5 INJECTION INTRAMUSCULAR; INTRAVENOUS; SUBCUTANEOUS ONCE AS NEEDED
Status: DISCONTINUED | OUTPATIENT
Start: 2018-08-09 | End: 2018-08-09 | Stop reason: HOSPADM

## 2018-08-09 RX ORDER — KETOROLAC TROMETHAMINE 30 MG/ML
INJECTION, SOLUTION INTRAMUSCULAR; INTRAVENOUS AS NEEDED
Status: DISCONTINUED | OUTPATIENT
Start: 2018-08-09 | End: 2018-08-09 | Stop reason: SURG

## 2018-08-09 RX ORDER — HYDROCODONE BITARTRATE AND ACETAMINOPHEN 5; 325 MG/1; MG/1
1-2 TABLET ORAL EVERY 6 HOURS PRN
Qty: 30 TABLET | Refills: 0 | Status: SHIPPED | OUTPATIENT
Start: 2018-08-09 | End: 2018-08-24

## 2018-08-09 RX ORDER — DEXTROSE AND SODIUM CHLORIDE 5; .45 G/100ML; G/100ML
80 INJECTION, SOLUTION INTRAVENOUS CONTINUOUS
Status: DISCONTINUED | OUTPATIENT
Start: 2018-08-09 | End: 2018-08-09 | Stop reason: HOSPADM

## 2018-08-09 RX ORDER — HYDROCODONE BITARTRATE AND ACETAMINOPHEN 5; 325 MG/1; MG/1
1 TABLET ORAL EVERY 4 HOURS PRN
Status: DISCONTINUED | OUTPATIENT
Start: 2018-08-09 | End: 2018-08-09 | Stop reason: HOSPADM

## 2018-08-09 RX ORDER — ONDANSETRON 2 MG/ML
4 INJECTION INTRAMUSCULAR; INTRAVENOUS EVERY 4 HOURS PRN
Status: DISCONTINUED | OUTPATIENT
Start: 2018-08-09 | End: 2018-08-09 | Stop reason: HOSPADM

## 2018-08-09 RX ORDER — FENTANYL CITRATE/PF 50 MCG/ML
50 SYRINGE (ML) INJECTION
Status: DISCONTINUED | OUTPATIENT
Start: 2018-08-09 | End: 2018-08-09 | Stop reason: HOSPADM

## 2018-08-09 RX ORDER — PROPOFOL 10 MG/ML
INJECTION, EMULSION INTRAVENOUS AS NEEDED
Status: DISCONTINUED | OUTPATIENT
Start: 2018-08-09 | End: 2018-08-09 | Stop reason: SURG

## 2018-08-09 RX ORDER — ONDANSETRON 4 MG/1
4 TABLET, ORALLY DISINTEGRATING ORAL EVERY 4 HOURS PRN
Status: DISCONTINUED | OUTPATIENT
Start: 2018-08-09 | End: 2018-08-09 | Stop reason: HOSPADM

## 2018-08-09 RX ORDER — FENTANYL CITRATE 50 UG/ML
INJECTION, SOLUTION INTRAMUSCULAR; INTRAVENOUS AS NEEDED
Status: DISCONTINUED | OUTPATIENT
Start: 2018-08-09 | End: 2018-08-09 | Stop reason: SURG

## 2018-08-09 RX ORDER — DEXAMETHASONE SODIUM PHOSPHATE 4 MG/ML
INJECTION, SOLUTION INTRA-ARTICULAR; INTRALESIONAL; INTRAMUSCULAR; INTRAVENOUS; SOFT TISSUE AS NEEDED
Status: DISCONTINUED | OUTPATIENT
Start: 2018-08-09 | End: 2018-08-09 | Stop reason: SURG

## 2018-08-09 RX ORDER — ACETAMINOPHEN 325 MG/1
650 TABLET ORAL EVERY 6 HOURS PRN
Status: DISCONTINUED | OUTPATIENT
Start: 2018-08-09 | End: 2018-08-09 | Stop reason: HOSPADM

## 2018-08-09 RX ORDER — SODIUM CHLORIDE 9 MG/ML
125 INJECTION, SOLUTION INTRAVENOUS CONTINUOUS
Status: DISCONTINUED | OUTPATIENT
Start: 2018-08-09 | End: 2018-08-09 | Stop reason: HOSPADM

## 2018-08-09 RX ORDER — MORPHINE SULFATE 10 MG/ML
2 INJECTION, SOLUTION INTRAMUSCULAR; INTRAVENOUS EVERY 2 HOUR PRN
Status: DISCONTINUED | OUTPATIENT
Start: 2018-08-09 | End: 2018-08-09 | Stop reason: HOSPADM

## 2018-08-09 RX ADMIN — SODIUM CHLORIDE 125 ML/HR: 0.9 INJECTION, SOLUTION INTRAVENOUS at 09:30

## 2018-08-09 RX ADMIN — DEXAMETHASONE SODIUM PHOSPHATE 4 MG: 4 INJECTION, SOLUTION INTRAMUSCULAR; INTRAVENOUS at 09:58

## 2018-08-09 RX ADMIN — FENTANYL CITRATE 75 MCG: 50 INJECTION, SOLUTION INTRAMUSCULAR; INTRAVENOUS at 10:26

## 2018-08-09 RX ADMIN — KETOROLAC TROMETHAMINE 30 MG: 30 INJECTION, SOLUTION INTRAMUSCULAR at 10:22

## 2018-08-09 RX ADMIN — FENTANYL CITRATE 25 MCG: 50 INJECTION, SOLUTION INTRAMUSCULAR; INTRAVENOUS at 09:52

## 2018-08-09 RX ADMIN — PROPOFOL 200 MG: 10 INJECTION, EMULSION INTRAVENOUS at 09:41

## 2018-08-09 RX ADMIN — ONDANSETRON HYDROCHLORIDE 4 MG: 2 INJECTION, SOLUTION INTRAVENOUS at 09:36

## 2018-08-09 RX ADMIN — SODIUM CHLORIDE: 0.9 INJECTION, SOLUTION INTRAVENOUS at 10:22

## 2018-08-09 RX ADMIN — MIDAZOLAM 2 MG: 1 INJECTION INTRAMUSCULAR; INTRAVENOUS at 09:36

## 2018-08-09 RX ADMIN — LIDOCAINE HYDROCHLORIDE 80 MG: 20 INJECTION, SOLUTION INTRAVENOUS at 09:41

## 2018-08-09 RX ADMIN — CEFAZOLIN SODIUM 1000 MG: 1 SOLUTION INTRAVENOUS at 09:44

## 2018-08-09 NOTE — INTERVAL H&P NOTE
H&P reviewed  After examining the patient I find no changes in the patients condition since the H&P had been written      Marked and discussed pre op

## 2018-08-09 NOTE — ANESTHESIA PREPROCEDURE EVALUATION
Review of Systems/Medical History  Patient summary reviewed  Chart reviewed  No history of anesthetic complications     Cardiovascular  Negative cardio ROS    Pulmonary  Negative pulmonary ROS        GI/Hepatic  Negative GI/hepatic ROS          Negative  ROS        Endo/Other  Negative endo/other ROS      GYN  Negative gynecology ROS          Hematology  Negative hematology ROS      Musculoskeletal  Negative musculoskeletal ROS        Neurology    Headaches,   Comment: RLS  Psychology   Psychiatric history (ADHD), Depression ,              Physical Exam    Airway    Mallampati score: II  TM Distance: >3 FB  Neck ROM: full     Dental   No notable dental hx     Cardiovascular  Comment: Negative ROS, Rhythm: regular, Rate: normal, Cardiovascular exam normal    Pulmonary  Pulmonary exam normal Breath sounds clear to auscultation,     Other Findings        Anesthesia Plan  ASA Score- 2     Anesthesia Type- general and IV sedation with anesthesia with ASA Monitors  Additional Monitors:   Airway Plan:         Plan Factors-Patient not instructed to abstain from smoking on day of procedure  Patient did not smoke on day of surgery  Induction- intravenous  Postoperative Plan-     Informed Consent- Anesthetic plan and risks discussed with patient and spouse

## 2018-08-09 NOTE — DISCHARGE INSTRUCTIONS
[facility] Instructions  Dr Antonio Best MD, FACS    1  General: You will feel pulling sensations around the wound or funny aches and pains around the incisions  This is normal  Even minor surgery is a change in your body and this is your bodys way of reaction to it  If you have had abdominal surgery, it may help to support the incision with a small pillow or blanket for comfort when moving or coughing  2  Wound care: Make sure to remove the bandage in about 24 hours, unless instructed otherwise  You usually don't have to redress the wound after 24-48 hours, unless for comfort  Keep the incision clean and dry  Let air get to it  If this Steri-Strips fall off, just keep the wound clean  3  Water: You may shower over the wound, unless there are drain tubes left in place  Do not bathe or use a pool or hot tub until cleared by the physician  You may shower right over the staples or Steri-Strips and packing dry when you are done  4  Activity: You may go up and down stairs, walk as much as you are comfortable, but walk at least 3 times each day  If you have had abdominal surgery, do not lift anything heavier than 15 pounds for at least 2-4 weeks, unless cleared by the doctor  5  Diet: You may resume a regular diet  If you had a same-day surgery or overnight stay surgery, you may wish to eat lightly for a few days: soups, crackers, and sandwiches  You may resume a regular diet when ready  6  Medications: Resume all of your previous medications, unless told otherwise by the doctor  Avoid aspirin or ibuprofen (Advil, Motrin, etc ) products for 2-3 days after the date of surgery  You may, at that time, began to take them again  Tylenol is always fine, unless you are taking any narcotic pain medication containing Tylenol (such as Percocet, Darvocet, Vicodin, or anything containing acetaminophen)  Do not take Tylenol if you're taking these medications   You do not need to take the narcotic pain medications unless you are having significant pain and discomfort  7  Driving: You will need someone to drive you home on the day of surgery  Do not drive or make any important decisions while on narcotic pain medication or 24 hours and after anesthesia or sedation for surgery  Generally, you may drive when your off all narcotic pain medications  8  Upset Stomach: You may take Maalox, Tums, or similar items for an upset stomach  If your narcotic pain medication causes an upset stomach, do not take it on an empty stomach  Try taking it with at least some crackers or toast      9  Constipation: Patients often experienced constipation after surgery  You may take over-the-counter medication for this, such as Metamucil, Senokot, Dulcolax, milk of magnesia, etc  You may take a suppository unless you have had anorectal surgery such as a procedure on your hemorrhoids  If you experience significant nausea or vomiting after abdominal surgery, call the office before trying any of these medications  10  Call the office: If you are experiencing any of the following, fevers above 101 5°, significant nausea or vomiting, if the wound develops drainage and/or is excessive redness around the wound, or if you have significant diarrhea or other worsening symptoms  11  Pain: You may be given a prescription for pain  This will be given to the hospital, the day of surgery  12  Sexual Activity: You may resume sexual activity when you feel ready and comfortable and your incision is sealed and healed without apparent infection risk  13  Urination: If you haven't urinated in 6 hours, go directly to the ER for evaluation for urinary retention  [address] , [address]  [facility], [address]   Phone: [phone]      Hydrocodone/Acetaminophen (By mouth)   Acetaminophen (j-ngvn-k-MIN-oh-fen), Hydrocodone Bitartrate (axq-ugxq-USJ-done bye-TAR-trate)  Treats pain  This medicine contains a narcotic pain reliever  Brand Name(s): Hycet, Lorcet, Lorcet HD, Lorcet Plus, Lortab 10/325, Lortab 5/325, Lortab 7 5/325, Lortab Elixir, Norco, Verdrocet, Vicodin, Vicodin ES, Vicodin HP, Xodol, Xodol 5/300   There may be other brand names for this medicine  When This Medicine Should Not Be Used: This medicine is not right for everyone  Do not use it if you had an allergic reaction to acetaminophen, hydrocodone, or other narcotic medicines, or stomach or bowel blockage (including paralytic ileus)  How to Use This Medicine:   Capsule, Liquid, Tablet  · Your doctor will tell you how much medicine to use  Do not use more than directed  · An overdose can be dangerous  Follow directions carefully so you do not get too much medicine at one time  · Oral liquid: Measure the oral liquid medicine with a marked measuring spoon, oral syringe, or medicine cup  · Drink plenty of liquids to help avoid constipation  · This medicine should come with a Medication Guide  Ask your pharmacist for a copy if you do not have one  · Missed dose: Take a dose as soon as you remember  If it is almost time for your next dose, wait until then and take a regular dose  Do not take extra medicine to make up for a missed dose  · Store the medicine in a closed container at room temperature, away from heat, moisture, and direct light  Flush any unused Norco® tablets down the toilet  Drugs and Foods to Avoid:   Ask your doctor or pharmacist before using any other medicine, including over-the-counter medicines, vitamins, and herbal products  · Do not use this medicine if you are using or have used an MAO inhibitor within the past 14 days  · Some medicines can affect how hydrocodone/acetaminophen works   Tell your doctor if you are using any of the following:   ¨ Carbamazepine, erythromycin, ketoconazole, mirtazapine, phenytoin, rifampin, ritonavir, tramadol, trazodone  ¨ Diuretic (water pill)  ¨ Medicine to treat depression or mental health problems  ¨ Medicine to treat migraine headaches  ¨ Phenothiazine medicine  · Tell your doctor if you use anything else that makes you sleepy  Some examples are allergy medicine, narcotic pain medicine, and alcohol  Tell your doctor if you are using buprenorphine, butorphanol, nalbuphine, pentazocine, or a muscle relaxer  · Do not drink alcohol while you are using this medicine  Acetaminophen can damage your liver, and your risk is higher if you also drink alcohol  Warnings While Using This Medicine:   · Tell your doctor if you are pregnant or breastfeeding, or if you have kidney disease, liver disease, lung or breathing problems, gallbladder or pancreas problems, an underactive thyroid, Iona disease, prostate problems, trouble urinating, stomach problems, or a history of head injury or brain tumor, seizures, alcohol or drug addiction  · This medicine may cause the following problems:   ¨ High risk of overdose, which can lead to death  ¨ Respiratory depression (serious breathing problem that can be life-threatening)  ¨ Liver problems  ¨ Serious skin reactions  ¨ Serotonin syndrome (when used with certain medicines)  · This medicine can be habit-forming  Do not use more than your prescribed dose  Call your doctor if you think your medicine is not working  · This medicine may make you dizzy or drowsy  Do not drive or doing anything else that could be dangerous until you know how this medicine affects you  · This medicine contains acetaminophen  Read the labels of all other medicines you are using to see if they also contain acetaminophen, or ask your doctor or pharmacist  Ramin Swann not use more than 4 grams (4,000 milligrams) total of acetaminophen in one day  · Tell any doctor or dentist who treats you that you are using this medicine  This medicine may affect certain medical test results  · This medicine may cause constipation, especially with long-term use   Ask your doctor if you should use a laxative to prevent and treat constipation  · This medicine could cause infertility  Talk with your doctor before using this medicine if you plan to have children  · Keep all medicine out of the reach of children  Never share your medicine with anyone  Possible Side Effects While Using This Medicine:   Call your doctor right away if you notice any of these side effects:  · Allergic reaction: Itching or hives, swelling in your face or hands, swelling or tingling in your mouth or throat, chest tightness, trouble breathing  · Anxiety, restlessness, fast heartbeat, fever, sweating, muscle spasms, twitching, diarrhea, seeing or hearing things that are not there  · Blistering, peeling, red skin rash  · Blue lips, fingernails, or skin  · Dark urine or pale stools, loss of appetite, nausea or vomiting, stomach pain, yellow skin or eyes  · Extreme weakness, shallow breathing, slow heartbeat, sweating, seizures, cold or clammy skin  · Lightheadedness, dizziness, fainting  If you notice these less serious side effects, talk with your doctor:   · Constipation, nausea, vomiting  · Tiredness or sleepiness  If you notice other side effects that you think are caused by this medicine, tell your doctor  Call your doctor for medical advice about side effects  You may report side effects to FDA at 6-851-FDA-1545  © 2017 [address] [employer] Information is for End User's use only and may not be sold, redistributed or otherwise used for commercial purposes  The above information is an  only  It is not intended as medical advice for individual conditions or treatments  Talk to your doctor, nurse or pharmacist before following any medical regimen to see if it is safe and effective for you

## 2018-08-09 NOTE — OP NOTE
EXCISION LIPOMA RT UPPER ARM MASS  Postoperative Note  PATIENT NAME: Austin Huang  : 1985  MRN: [id number]  [facility]OM 07    Surgery Date: 2018    Pre operative diagnosis:   Soft tissue mass [M79 9]    Operative Indications:  Soft tissue mass    Operative Findings:  10 cm large lipoma above the deltoid    Consent:  The risks, benefits, and alternatives to the surgery were discussed with the patient and with the family prior to surgery, personally by Dr Quan Chapman  If the consent was obtained by the physician assistant or other representative, the consent was reviewed once again personally by the operating physician  Common complications particular for this procedure as well as unusual complications were discussed, including but not limited to:  bleeding, wound infection, prolonged wound healing, open wounds, reoperation and/or recurrence of the lesion  A  was used if necessary  The patient expressed understanding of the issues discussed and wished and consented to the procedure to proceed  All questions were answered  Dr Quan Chapman personally discussed the informed consent with this patient  Post operative diagnosis :and findings  Post-Op Diagnosis Codes: * Soft tissue mass [M79 9]    Procedure:   Procedure(s):  EXCISION LIPOMA RT UPPER ARM MASS    Surgeon(s) and Role:     * Samy Edwards MD - Primary    The Physician Assistant was medically necessary for surgical safety the case including suturing, retraction, and hemostasis  No qualified resident was available  I was present for the entire procedure  Drains:       Specimens:  ID Type Source Tests Collected by Time Destination   1 : right upper arm lipoma Tissue Arm, Right TISSUE EXAM Samy Edwards MD 2018 1004        Estimated Blood Loss:   Minimal    Anesthesia Type:   Choice     Procedure: The patient was seen in the Holding Room   The risks, benefits, complications, treatment options, and expected outcomes were discussed with the patient  The possibilities of reaction to medication, bleeding, infection, the need for additional procedures, failure to diagnose a condition, and creating a complication operation were discussed with the patient  The patient concurred with the proposed plan, giving informed consent  The site of surgery properly noted/marked  The patient was taken to Operating Room, identified as Sofia Medrano and staff verified the patient name, , site, and laterality, if applicable  A Time Out was held and the above information confirmed  The patient was placed supine  The right arm was prepped and draped in standard fashion  Local anesthesia was used to anesthetize the skin surrounding a 10 cm lesion  A oblique elliptical incision was made over the lesion  Sharp and blunt dissection were used to mobilize the mass which was in a subcutaneous location  Hemostasis was achieved with cautery  Scissors, knife, and cautery were used in the excision so that 2mm  margins were taken around the lesion  Closure was achieved a with layered closure utilizing a 3-0 Vicryl subcutaneous layer and a  4-0 Monocryl subcuticular stitch  Steristrips   applied and the wound dressed  At the end of the operation, all sponge, instrument, and needle counts were correct  Skin and soft tissue and fat were removed along with the mass  Some portions of this records may have been generated with voice recognition software  There may be translation, syntax,  or grammatical errors  Occasional wrong word or "sound-a-like" substitutions may have occurred due to the inherent limitations of the voice recognition software  Read the chart carefully and recognize, using context, where substations may have occurred  If you have any questions, please contact the dictating provider for clarification or correction, as needed         Complications: None    Condition: Stable to PACU    SIGNATURE: Jamie Dia MD   DATE: August 9, 2018   TIME: 10:30 AM

## 2018-08-09 NOTE — DISCHARGE SUMMARY
Discharge Summary - Alexandra Blum 27 y o  female MRN: [id number]    Unit/Bed#: OR [facility] Encounter: [id number]      Pre-Operative Diagnosis: Pre-Op Diagnosis Codes: * Soft tissue mass [M79 9]    Post-Operative Diagnosis: Post-Op Diagnosis Codes: * Soft tissue mass [M79 9]    Procedures Performed:  Procedure(s):  EXCISION LIPOMA RT UPPER ARM MASS    Surgeon: Howard Dominguez MD    See H & P for full details of admission and Operative Note for full details of operations performed  Patient was seen and examined prior to discharge  Provisions for Follow-Up Care:  See After Visit Summary for information related to follow-up care and home orders  Disposition: Home, in stable condition  Planned Readmission: No    Discharge Medications:  See after visit summary for reconciled discharge medications provided to patient and family  Post Operative instructions: Reviewed with patient and/or family  Some portions of this record may have been generated with voice recognition software  There may be translation, syntax,  or grammatical errors  Occasional wrong word or "sound-a-like" substitutions may have occurred due to the inherent limitations of the voice recognition software  Read the chart carefully and recognize, using context, where substitutions may have occurred  If you have any questions, please contact the dictating provider for clarification or correction, as needed  This encounter has been coded by non certified Coder      Signature:   Howard Dominguez MD  Date: 8/9/2018 Time: 10:30 AM

## 2018-08-10 ENCOUNTER — TELEPHONE (OUTPATIENT)
Dept: SURGERY | Facility: CLINIC | Age: 33
End: 2018-08-10

## 2018-08-10 NOTE — TELEPHONE ENCOUNTER
S/P = R Upper Arm Mass = 8/9/18    Called and spoke to patient  She states that feels sore, but minimal pain  Hasn't taken any pain meds  She states no F/V/N/C  She stated that she had a normal BM  She's aware that if anything changes, to call the office  POPV scheduled for 8/24/18  Confirmed with patient  Patient is aware she'll be called when pathology has been finalized and verified  Path pending

## 2018-08-14 NOTE — TELEPHONE ENCOUNTER
Pathology has been finalized  Just awaiting provider verification  Reminder: Patients POPV needs to be r/s to a later time (same date)

## 2018-08-24 ENCOUNTER — OFFICE VISIT (OUTPATIENT)
Dept: SURGERY | Facility: CLINIC | Age: 33
End: 2018-08-24

## 2018-08-24 VITALS
BODY MASS INDEX: 26.52 KG/M2 | TEMPERATURE: 98.4 F | SYSTOLIC BLOOD PRESSURE: 108 MMHG | WEIGHT: 165 LBS | RESPIRATION RATE: 18 BRPM | HEIGHT: 66 IN | DIASTOLIC BLOOD PRESSURE: 74 MMHG | HEART RATE: 68 BPM

## 2018-08-24 DIAGNOSIS — M79.89 SOFT TISSUE MASS: Primary | ICD-10-CM

## 2018-08-24 PROCEDURE — 99024 POSTOP FOLLOW-UP VISIT: CPT | Performed by: SURGERY

## 2018-08-24 NOTE — PROGRESS NOTES
Assessment/Plan:   Sofia Medrano is a 26 y  o female who comes in today for postoperative check status post excision of mass of right upper extremity  Patient is extremely pleased with her results  Patient admits to some paresthesia along incision    Pathology: Reviewed with patient, all questions answered  Consistent with lipoma    Postoperative restrictions reviewed  All questions answered  Patient has strong colon cancer history in her family grandfather at age of 54  Although she does not know her father's family history due to estrangement    Patient desires to have colonoscopy within the next year due to strong family history of cancers  Also complaining of some bowel changes  Patient will follow up to discuss colonoscopy in the future   ____________________________________________________________    HPI:  Sofia Medrano is a 26 y  o female who comes in today for postoperative check after recent surgery  Currently doing well without problems, no fever or chills,no nausea and no vomiting  Reports some numbness along incision  ROS:  General ROS: negative for - chills, fatigue, fever or night sweats, weight loss  Respiratory ROS: no cough, shortness of breath, or wheezing  Cardiovascular ROS: no chest pain or dyspnea on exertion  Genito-Urinary ROS: no dysuria, trouble voiding, or hematuria  Musculoskeletal ROS: negative for - gait disturbance, joint pain or muscle pain  Neurological ROS: no TIA or stroke symptoms  GI ROS: see HPI  Skin ROS: no new rashes or lesions   Lymphatic ROS: no new adenopathy noted by pt     GYN ROS: see HPI, no new GYN history or bleeding noted  Psy ROS: no new mental or behavioral disturbances       Patient Active Problem List   Diagnosis    ADHD (attention deficit hyperactivity disorder)    Migraine    Urticaria    Soft tissue mass         Allergies:  Iodinated diagnostic agents and Iodine      Current Outpatient Prescriptions:    amphetamine-dextroamphetamine (ADDERALL) 30 MG tablet, Take 1 tablet (30 mg total) by mouth daily for 30 days Max Daily Amount: 30 mg, Disp: 30 tablet, Rfl: 0    diphenhydrAMINE (BENADRYL) 50 mg capsule, diphenhydramine 50 mg capsule, Disp: , Rfl:     Past Medical History:   Diagnosis Date    ADHD     Lipoma     Migraine        Past Surgical History:   Procedure Laterality Date    LA EXC SKIN BENIG <0 5 CM TRUNK,ARM,LEG Right 8/9/2018    Procedure: EXCISION LIPOMA RT UPPER ARM MASS;  Surgeon: Amee Kim MD;  [facility];  Service: General    TOOTH EXTRACTION         Family History   Problem Relation Age of Onset   [employer] ADD / ADHD Mother     Alcohol abuse Father     Substance Abuse Brother     Alcohol abuse Brother     ADD / ADHD Brother     Multiple sclerosis Maternal Aunt     Alcohol abuse Family     Substance Abuse Family     Cancer Family         reports that she has never smoked  She has never used smokeless tobacco  She reports that she drinks alcohol  She reports that she does not use drugs  Invalid input(s):  EOSPCT          Invalid input(s): LABALBU    Imaging: No new pertinent imaging studies  PHYSICAL EXAM  General: normal, cooperative, no distress  Incision: clean, dry, and intact and healing well      Some portions of this record may have been generated with voice recognition software  There may be translation, syntax,  or grammatical errors  Occasional wrong word or "sound-a-like" substitutions may have occurred due to the inherent limitations of the voice recognition software  Read the chart carefully and recognize, using context, where substitutions may have occurred  If you have any questions, please contact the dictating provider for clarification or correction, as needed  This encounter has been coded by a non-certified coder         Amee Kim MD    Date: 8/24/2018 Time: 10:10 AM

## 2018-08-24 NOTE — LETTER
August 24, 2018     Greg Jerez MD  [address]    Patient: Sofia Medrano   YOB: 1985   Date of Visit: 8/24/2018       Dear Dr Preet Simmons:    Thank you for referring Paula Wise to me for evaluation  Below are my notes for this consultation  If you have questions, please do not hesitate to call me  I look forward to following your patient along with you  Sincerely,        Jamie Dia MD        CC: No Recipients  Martina Rosalee  8/24/2018 10:13 AM  Sign at close encounter  Assessment/Plan:   Sofia Medrano is a 26 y  o female who comes in today for postoperative check status post excision of mass of right upper extremity  Patient is extremely pleased with her results  Patient admits to some paresthesia along incision    Pathology: Reviewed with patient, all questions answered  Consistent with lipoma    Postoperative restrictions reviewed  All questions answered  Patient has strong colon cancer history in her family grandfather at age of 54  Although she does not know her father's family history due to estrangement    Patient desires to have colonoscopy within the next year due to strong family history of cancers  Also complaining of some bowel changes  Patient will follow up to discuss colonoscopy in the future   ____________________________________________________________    HPI:  Sofia Medrano is a 26 y  o female who comes in today for postoperative check after recent surgery  Currently doing well without problems, no fever or chills,no nausea and no vomiting  Reports some numbness along incision      ROS:  General ROS: negative for - chills, fatigue, fever or night sweats, weight loss  Respiratory ROS: no cough, shortness of breath, or wheezing  Cardiovascular ROS: no chest pain or dyspnea on exertion  Genito-Urinary ROS: no dysuria, trouble voiding, or hematuria  Musculoskeletal ROS: negative for - gait disturbance, joint pain or muscle pain  Neurological ROS: no TIA or stroke symptoms  GI ROS: see HPI  Skin ROS: no new rashes or lesions   Lymphatic ROS: no new adenopathy noted by pt  GYN ROS: see HPI, no new GYN history or bleeding noted  Psy ROS: no new mental or behavioral disturbances       Patient Active Problem List   Diagnosis    ADHD (attention deficit hyperactivity disorder)    Migraine    Urticaria    Soft tissue mass         Allergies:  Iodinated diagnostic agents and Iodine      Current Outpatient Prescriptions:     amphetamine-dextroamphetamine (ADDERALL) 30 MG tablet, Take 1 tablet (30 mg total) by mouth daily for 30 days Max Daily Amount: 30 mg, Disp: 30 tablet, Rfl: 0    diphenhydrAMINE (BENADRYL) 50 mg capsule, diphenhydramine 50 mg capsule, Disp: , Rfl:     Past Medical History:   Diagnosis Date    ADHD     Lipoma     Migraine        Past Surgical History:   Procedure Laterality Date    UT EXC SKIN BENIG <0 5 CM TRUNK,ARM,LEG Right 8/9/2018    Procedure: EXCISION LIPOMA RT UPPER ARM MASS;  Surgeon: Bruce Rosas MD;  [facility];  Service: General    TOOTH EXTRACTION         Family History   Problem Relation Age of Onset   [employer] [place] ADD / ADHD Mother     Alcohol abuse Father     Substance Abuse Brother     Alcohol abuse Brother     ADD / ADHD Brother     Multiple sclerosis Maternal Aunt     Alcohol abuse Family     Substance Abuse Family     Cancer Family         reports that she has never smoked  She has never used smokeless tobacco  She reports that she drinks alcohol  She reports that she does not use drugs  Invalid input(s):  EOSPCT          Invalid input(s): LABALBU    Imaging: No new pertinent imaging studies  PHYSICAL EXAM  General: normal, cooperative, no distress  Incision: clean, dry, and intact and healing well      Some portions of this record may have been generated with voice recognition software  There may be translation, syntax,  or grammatical errors   Occasional wrong word or "sound-a-like" substitutions may have occurred due to the inherent limitations of the voice recognition software  Read the chart carefully and recognize, using context, where substitutions may have occurred  If you have any questions, please contact the dictating provider for clarification or correction, as needed  This encounter has been coded by a non-certified coder         Jan Landers MD    Date: 8/24/2018 Time: 10:10 AM

## 2018-09-06 ENCOUNTER — TELEPHONE (OUTPATIENT)
Dept: FAMILY MEDICINE CLINIC | Facility: CLINIC | Age: 33
End: 2018-09-06

## 2018-09-06 DIAGNOSIS — F90.9 ATTENTION DEFICIT HYPERACTIVITY DISORDER (ADHD), UNSPECIFIED ADHD TYPE: ICD-10-CM

## 2018-09-06 DIAGNOSIS — N83.209 CYST OF OVARY, UNSPECIFIED LATERALITY: Primary | ICD-10-CM

## 2018-09-06 RX ORDER — DEXTROAMPHETAMINE SACCHARATE, AMPHETAMINE ASPARTATE, DEXTROAMPHETAMINE SULFATE AND AMPHETAMINE SULFATE 7.5; 7.5; 7.5; 7.5 MG/1; MG/1; MG/1; MG/1
1 TABLET ORAL DAILY
Qty: 30 TABLET | Refills: 0 | Status: SHIPPED | OUTPATIENT
Start: 2018-09-06 | End: 2018-10-10 | Stop reason: SDUPTHER

## 2018-09-06 RX ORDER — MELOXICAM 7.5 MG/1
7.5 TABLET ORAL DAILY
Qty: 30 TABLET | Refills: 1 | Status: SHIPPED | OUTPATIENT
Start: 2018-09-06 | End: 2019-07-08 | Stop reason: CLARIF

## 2018-09-06 NOTE — TELEPHONE ENCOUNTER
Patient called in for refill on   amphetamine-dextroamphetamine 30 MG tablet   Sent to [facility] on file  Last ov was 07/02/18

## 2018-09-06 NOTE — TELEPHONE ENCOUNTER
Pt called in and stated last time she was seen by you you gave her an rx for naproxen, medication was on back order and pt was unable to pick it up pt will like to know if you can give her a different rx , she believes her cyst came back on the left side now please advise

## 2018-09-06 NOTE — TELEPHONE ENCOUNTER
Its for a ovarian cyst on the left side  What was removed was removed a tumor  She states you gave her a rx for it a couple months ago but can not fill due to pharmacy being on back order

## 2018-09-06 NOTE — TELEPHONE ENCOUNTER
I am a little confused-is naproxen for cyst or a separate issue-I thought cyst was taken care of surgically

## 2018-10-10 ENCOUNTER — TELEPHONE (OUTPATIENT)
Dept: FAMILY MEDICINE CLINIC | Facility: CLINIC | Age: 33
End: 2018-10-10

## 2018-10-10 DIAGNOSIS — F90.9 ATTENTION DEFICIT HYPERACTIVITY DISORDER (ADHD), UNSPECIFIED ADHD TYPE: ICD-10-CM

## 2018-10-10 RX ORDER — DEXTROAMPHETAMINE SACCHARATE, AMPHETAMINE ASPARTATE, DEXTROAMPHETAMINE SULFATE AND AMPHETAMINE SULFATE 7.5; 7.5; 7.5; 7.5 MG/1; MG/1; MG/1; MG/1
1 TABLET ORAL DAILY
Qty: 30 TABLET | Refills: 0 | Status: SHIPPED | OUTPATIENT
Start: 2018-10-10 | End: 2018-11-13 | Stop reason: SDUPTHER

## 2018-10-10 NOTE — TELEPHONE ENCOUNTER
Pt called in for refill on  amphetamine-dextroamphetamine 30 MG tablet  30 days 30 qty  Sent to [facility] on file  Last ov was 07/02/18

## 2018-10-10 NOTE — TELEPHONE ENCOUNTER
Pharmacist called confirming if is ok to fill generic for pt Adderall  Pharmacist states Rx was sent as Brand only as necessary  Pls advise

## 2018-11-13 DIAGNOSIS — F90.9 ATTENTION DEFICIT HYPERACTIVITY DISORDER (ADHD), UNSPECIFIED ADHD TYPE: ICD-10-CM

## 2018-11-13 NOTE — TELEPHONE ENCOUNTER
Patient called in for refill on  amphetamine-dextroamphetamine 30 MG tablet   30 days 30 qty  Sent to [facility]  Last ov was 07/02/18

## 2018-11-14 RX ORDER — DEXTROAMPHETAMINE SACCHARATE, AMPHETAMINE ASPARTATE, DEXTROAMPHETAMINE SULFATE AND AMPHETAMINE SULFATE 7.5; 7.5; 7.5; 7.5 MG/1; MG/1; MG/1; MG/1
1 TABLET ORAL DAILY
Qty: 30 TABLET | Refills: 0 | Status: SHIPPED | OUTPATIENT
Start: 2018-11-14 | End: 2018-12-13 | Stop reason: SDUPTHER

## 2018-12-13 ENCOUNTER — TELEPHONE (OUTPATIENT)
Dept: FAMILY MEDICINE CLINIC | Facility: CLINIC | Age: 33
End: 2018-12-13

## 2018-12-13 DIAGNOSIS — F90.9 ATTENTION DEFICIT HYPERACTIVITY DISORDER (ADHD), UNSPECIFIED ADHD TYPE: ICD-10-CM

## 2018-12-13 RX ORDER — DEXTROAMPHETAMINE SACCHARATE, AMPHETAMINE ASPARTATE, DEXTROAMPHETAMINE SULFATE AND AMPHETAMINE SULFATE 7.5; 7.5; 7.5; 7.5 MG/1; MG/1; MG/1; MG/1
1 TABLET ORAL DAILY
Qty: 30 TABLET | Refills: 0 | Status: SHIPPED | OUTPATIENT
Start: 2018-12-13 | End: 2019-01-07 | Stop reason: SDUPTHER

## 2018-12-13 NOTE — TELEPHONE ENCOUNTER
Needs refill for:  Adderall 30mg for a 30 day supply, called into [facility] @ [facility]

## 2019-01-07 ENCOUNTER — OFFICE VISIT (OUTPATIENT)
Dept: FAMILY MEDICINE CLINIC | Facility: CLINIC | Age: 34
End: 2019-01-07
Payer: COMMERCIAL

## 2019-01-07 VITALS
RESPIRATION RATE: 14 BRPM | HEART RATE: 97 BPM | WEIGHT: 168 LBS | OXYGEN SATURATION: 98 % | BODY MASS INDEX: 27 KG/M2 | TEMPERATURE: 99.1 F | HEIGHT: 66 IN | DIASTOLIC BLOOD PRESSURE: 78 MMHG | SYSTOLIC BLOOD PRESSURE: 116 MMHG

## 2019-01-07 DIAGNOSIS — F98.8 ATTENTION DEFICIT DISORDER (ADD) WITHOUT HYPERACTIVITY: Primary | ICD-10-CM

## 2019-01-07 DIAGNOSIS — F90.9 ATTENTION DEFICIT HYPERACTIVITY DISORDER (ADHD), UNSPECIFIED ADHD TYPE: ICD-10-CM

## 2019-01-07 PROCEDURE — 3008F BODY MASS INDEX DOCD: CPT | Performed by: FAMILY MEDICINE

## 2019-01-07 PROCEDURE — 1036F TOBACCO NON-USER: CPT | Performed by: FAMILY MEDICINE

## 2019-01-07 PROCEDURE — 99213 OFFICE O/P EST LOW 20 MIN: CPT | Performed by: FAMILY MEDICINE

## 2019-01-07 RX ORDER — DEXTROAMPHETAMINE SACCHARATE, AMPHETAMINE ASPARTATE, DEXTROAMPHETAMINE SULFATE AND AMPHETAMINE SULFATE 7.5; 7.5; 7.5; 7.5 MG/1; MG/1; MG/1; MG/1
1 TABLET ORAL DAILY
Qty: 30 TABLET | Refills: 0 | Status: SHIPPED | OUTPATIENT
Start: 2019-01-07 | End: 2019-03-01 | Stop reason: SDUPTHER

## 2019-01-07 NOTE — PROGRESS NOTES
Assessment/Plan:    Attention deficit disorder (ADD) without hyperactivity  Doing well on meds       Diagnoses and all orders for this visit:    Attention deficit disorder (ADD) without hyperactivity    Attention deficit hyperactivity disorder (ADHD), unspecified ADHD type  -     amphetamine-dextroamphetamine (ADDERALL) 30 MG tablet; Take 1 tablet (30 mg total) by mouth daily for 30 days Max Daily Amount: 30 mg          Subjective:      Patient ID: Adeola Justice is a 26 y o  female  F/u add-doing well        The following portions of the patient's history were reviewed and updated as appropriate: allergies, current medications, past family history, past medical history, past social history, past surgical history and problem list       Review of Systems   Constitutional: Positive for fever  Negative for activity change and appetite change  HENT: Negative for ear pain, postnasal drip and rhinorrhea  Respiratory: Negative for shortness of breath  Cardiovascular: Negative for chest pain  Neurological: Negative for dizziness and headaches  Psychiatric/Behavioral: Negative for decreased concentration  The patient is not nervous/anxious  Objective:      /78 (BP Location: Left arm, Patient Position: Sitting, Cuff Size: Standard)   Pulse 97   Temp 99 1 °F (37 3 °C) (Temporal)   Resp 14   Ht 5' 6" (1 676 m)   Wt 76 2 kg (168 lb)   SpO2 98%   BMI 27 12 kg/m²          Physical Exam   Constitutional: She appears well-developed and well-nourished  HENT:   Right Ear: Tympanic membrane normal    Left Ear: Tympanic membrane normal    Nose: No mucosal edema or rhinorrhea  Right sinus exhibits no maxillary sinus tenderness  Left sinus exhibits no maxillary sinus tenderness  Mouth/Throat: No oropharyngeal exudate, posterior oropharyngeal edema or posterior oropharyngeal erythema  Cardiovascular: Normal rate, regular rhythm and normal heart sounds      Pulmonary/Chest: Breath sounds normal  Lymphadenopathy:     She has no cervical adenopathy  Vitals reviewed

## 2019-03-01 DIAGNOSIS — F90.9 ATTENTION DEFICIT HYPERACTIVITY DISORDER (ADHD), UNSPECIFIED ADHD TYPE: ICD-10-CM

## 2019-03-01 RX ORDER — DEXTROAMPHETAMINE SACCHARATE, AMPHETAMINE ASPARTATE, DEXTROAMPHETAMINE SULFATE AND AMPHETAMINE SULFATE 7.5; 7.5; 7.5; 7.5 MG/1; MG/1; MG/1; MG/1
1 TABLET ORAL DAILY
Qty: 30 TABLET | Refills: 0 | Status: SHIPPED | OUTPATIENT
Start: 2019-03-01 | End: 2019-04-02 | Stop reason: SDUPTHER

## 2019-03-01 NOTE — TELEPHONE ENCOUNTER
Pt called and needs a refill on her Adderall 30 mg tablets  Please send to [facility] on [address]

## 2019-04-02 DIAGNOSIS — F90.9 ATTENTION DEFICIT HYPERACTIVITY DISORDER (ADHD), UNSPECIFIED ADHD TYPE: ICD-10-CM

## 2019-04-03 RX ORDER — DEXTROAMPHETAMINE SACCHARATE, AMPHETAMINE ASPARTATE, DEXTROAMPHETAMINE SULFATE AND AMPHETAMINE SULFATE 7.5; 7.5; 7.5; 7.5 MG/1; MG/1; MG/1; MG/1
1 TABLET ORAL DAILY
Qty: 30 TABLET | Refills: 0 | Status: SHIPPED | OUTPATIENT
Start: 2019-04-03 | End: 2019-05-06 | Stop reason: SDUPTHER

## 2019-05-06 DIAGNOSIS — F90.9 ATTENTION DEFICIT HYPERACTIVITY DISORDER (ADHD), UNSPECIFIED ADHD TYPE: ICD-10-CM

## 2019-05-06 RX ORDER — DEXTROAMPHETAMINE SACCHARATE, AMPHETAMINE ASPARTATE, DEXTROAMPHETAMINE SULFATE AND AMPHETAMINE SULFATE 7.5; 7.5; 7.5; 7.5 MG/1; MG/1; MG/1; MG/1
1 TABLET ORAL DAILY
Qty: 30 TABLET | Refills: 0 | Status: SHIPPED | OUTPATIENT
Start: 2019-05-06 | End: 2019-06-07 | Stop reason: SDUPTHER

## 2019-06-07 DIAGNOSIS — F90.9 ATTENTION DEFICIT HYPERACTIVITY DISORDER (ADHD), UNSPECIFIED ADHD TYPE: ICD-10-CM

## 2019-06-09 RX ORDER — DEXTROAMPHETAMINE SACCHARATE, AMPHETAMINE ASPARTATE, DEXTROAMPHETAMINE SULFATE AND AMPHETAMINE SULFATE 7.5; 7.5; 7.5; 7.5 MG/1; MG/1; MG/1; MG/1
1 TABLET ORAL DAILY
Qty: 30 TABLET | Refills: 0 | Status: SHIPPED | OUTPATIENT
Start: 2019-06-09 | End: 2019-07-08 | Stop reason: SDUPTHER

## 2019-07-08 ENCOUNTER — OFFICE VISIT (OUTPATIENT)
Dept: FAMILY MEDICINE CLINIC | Facility: CLINIC | Age: 34
End: 2019-07-08
Payer: COMMERCIAL

## 2019-07-08 VITALS
BODY MASS INDEX: 24.11 KG/M2 | DIASTOLIC BLOOD PRESSURE: 78 MMHG | RESPIRATION RATE: 18 BRPM | HEIGHT: 66 IN | HEART RATE: 84 BPM | WEIGHT: 150 LBS | SYSTOLIC BLOOD PRESSURE: 120 MMHG

## 2019-07-08 DIAGNOSIS — M79.642 PAIN IN BOTH HANDS: ICD-10-CM

## 2019-07-08 DIAGNOSIS — M79.641 PAIN IN BOTH HANDS: ICD-10-CM

## 2019-07-08 DIAGNOSIS — F90.9 ATTENTION DEFICIT HYPERACTIVITY DISORDER (ADHD), UNSPECIFIED ADHD TYPE: ICD-10-CM

## 2019-07-08 DIAGNOSIS — F98.8 ATTENTION DEFICIT DISORDER (ADD) WITHOUT HYPERACTIVITY: Primary | ICD-10-CM

## 2019-07-08 DIAGNOSIS — R42 DIZZINESS: ICD-10-CM

## 2019-07-08 PROCEDURE — 99213 OFFICE O/P EST LOW 20 MIN: CPT | Performed by: FAMILY MEDICINE

## 2019-07-08 PROCEDURE — 1036F TOBACCO NON-USER: CPT | Performed by: FAMILY MEDICINE

## 2019-07-08 PROCEDURE — 3008F BODY MASS INDEX DOCD: CPT | Performed by: FAMILY MEDICINE

## 2019-07-08 RX ORDER — DEXTROAMPHETAMINE SACCHARATE, AMPHETAMINE ASPARTATE, DEXTROAMPHETAMINE SULFATE AND AMPHETAMINE SULFATE 7.5; 7.5; 7.5; 7.5 MG/1; MG/1; MG/1; MG/1
1 TABLET ORAL DAILY
Qty: 30 TABLET | Refills: 0 | Status: SHIPPED | OUTPATIENT
Start: 2019-07-08 | End: 2019-08-28 | Stop reason: SDUPTHER

## 2019-07-08 NOTE — PROGRESS NOTES
Assessment/Plan:    Attention deficit disorder (ADD) without hyperactivity  Stable on meds       Diagnoses and all orders for this visit:    Attention deficit disorder (ADD) without hyperactivity    Dizziness  -     Comprehensive metabolic panel; Future  -     CBC and differential; Future          Subjective:      Patient ID: Sofia Medrano is a 26 y o  female  Here for f/u add and dizziness, also pain l 3rd finger and r wrist-? arthritis      The following portions of the patient's history were reviewed and updated as appropriate: allergies, current medications, past family history, past medical history, past social history, past surgical history and problem list       Review of Systems   Constitutional: Negative for activity change, appetite change and unexpected weight change  Respiratory: Negative for shortness of breath  Cardiovascular: Negative for chest pain  Musculoskeletal: Positive for arthralgias  Neurological: Positive for dizziness  Psychiatric/Behavioral: Positive for decreased concentration  Objective:      /78 (BP Location: Right arm, Patient Position: Sitting, Cuff Size: Standard)   Pulse 84   Resp 18   Ht 5' 6" (1 676 m)   Wt 68 kg (150 lb)   BMI 24 21 kg/m²          Physical Exam   Constitutional: She appears well-developed and well-nourished  Neck: No thyromegaly present  Cardiovascular: Normal rate, regular rhythm and normal heart sounds  Pulmonary/Chest: Effort normal and breath sounds normal    Musculoskeletal: She exhibits tenderness and deformity  Right hand: She exhibits tenderness and deformity  Left hand: She exhibits tenderness and deformity  Lymphadenopathy:     She has no cervical adenopathy  Vitals reviewed

## 2019-08-28 DIAGNOSIS — F90.9 ATTENTION DEFICIT HYPERACTIVITY DISORDER (ADHD), UNSPECIFIED ADHD TYPE: ICD-10-CM

## 2019-08-28 RX ORDER — DEXTROAMPHETAMINE SACCHARATE, AMPHETAMINE ASPARTATE, DEXTROAMPHETAMINE SULFATE AND AMPHETAMINE SULFATE 7.5; 7.5; 7.5; 7.5 MG/1; MG/1; MG/1; MG/1
1 TABLET ORAL DAILY
Qty: 30 TABLET | Refills: 0 | Status: SHIPPED | OUTPATIENT
Start: 2019-08-28 | End: 2019-12-16 | Stop reason: SDUPTHER

## 2019-09-14 DIAGNOSIS — F90.9 ATTENTION DEFICIT HYPERACTIVITY DISORDER (ADHD), UNSPECIFIED ADHD TYPE: ICD-10-CM

## 2019-09-14 RX ORDER — DEXTROAMPHETAMINE SACCHARATE, AMPHETAMINE ASPARTATE, DEXTROAMPHETAMINE SULFATE AND AMPHETAMINE SULFATE 5; 5; 5; 5 MG/1; MG/1; MG/1; MG/1
TABLET ORAL
Qty: 45 TABLET | Refills: 0 | Status: SHIPPED | OUTPATIENT
Start: 2019-09-14 | End: 2019-10-15 | Stop reason: SDUPTHER

## 2019-09-14 NOTE — TELEPHONE ENCOUNTER
Pt states her Generic Adderal 30 mg is on Backorder  She states the Brand is available but it is too expensive  She would like to switch to XR just for now b/c she is not a fan of the way it made her feel but does not want to be without  I called [facility] just to confirm and they state they do have Adderal 20 mg in stock and pt could use that at 1 and 1/2 tablets daily  I called and spoke to pt and she would prefer that  Rx enclosed  PDMP confirmed under pt's maiden name

## 2019-10-15 DIAGNOSIS — F90.9 ATTENTION DEFICIT HYPERACTIVITY DISORDER (ADHD), UNSPECIFIED ADHD TYPE: ICD-10-CM

## 2019-10-15 RX ORDER — DEXTROAMPHETAMINE SACCHARATE, AMPHETAMINE ASPARTATE, DEXTROAMPHETAMINE SULFATE AND AMPHETAMINE SULFATE 5; 5; 5; 5 MG/1; MG/1; MG/1; MG/1
TABLET ORAL
Qty: 45 TABLET | Refills: 0 | Status: SHIPPED | OUTPATIENT
Start: 2019-10-15 | End: 2019-11-13 | Stop reason: SDUPTHER

## 2019-11-13 DIAGNOSIS — F90.9 ATTENTION DEFICIT HYPERACTIVITY DISORDER (ADHD), UNSPECIFIED ADHD TYPE: ICD-10-CM

## 2019-11-13 RX ORDER — DEXTROAMPHETAMINE SACCHARATE, AMPHETAMINE ASPARTATE, DEXTROAMPHETAMINE SULFATE AND AMPHETAMINE SULFATE 5; 5; 5; 5 MG/1; MG/1; MG/1; MG/1
TABLET ORAL
Qty: 45 TABLET | Refills: 0 | Status: SHIPPED | OUTPATIENT
Start: 2019-11-13 | End: 2019-12-16 | Stop reason: RX

## 2019-12-16 DIAGNOSIS — F90.9 ATTENTION DEFICIT HYPERACTIVITY DISORDER (ADHD), UNSPECIFIED ADHD TYPE: ICD-10-CM

## 2019-12-16 RX ORDER — DEXTROAMPHETAMINE SACCHARATE, AMPHETAMINE ASPARTATE, DEXTROAMPHETAMINE SULFATE AND AMPHETAMINE SULFATE 7.5; 7.5; 7.5; 7.5 MG/1; MG/1; MG/1; MG/1
1 TABLET ORAL DAILY
Qty: 30 TABLET | Refills: 0 | Status: SHIPPED | OUTPATIENT
Start: 2019-12-16 | End: 2020-01-14 | Stop reason: SDUPTHER

## 2019-12-16 NOTE — TELEPHONE ENCOUNTER
11/13/2019  1  11/13/2019  DEXTROAMP-AMPHETAMIN 20 MG TAB  45 0  30  MA ARCHANA  [id number]  [facility] (5244)  0   [place]    10/15/2019  1  10/15/2019  DEXTROAMP-AMPHETAMIN 20 MG TAB  45 0  30  MA ARCHANA  [id number]  [facility] (0135)  0   [place]    09/14/2019  1  09/14/2019  DEXTROAMP-AMPHETAMIN 20 MG TAB  45 0  30  [facility]  [id number]  [facility] (0098)  0   [place]    07/15/2019  2  07/08/2019  DEXTROAMP-AMPHETAMIN 30 MG TAB  30 0  30  MA ARCHANA  [id number]  [facility] (7601)  0   [place]      [place] reviewed  [facility] have pt's original 30 mg Adderall in stock so RX changed back to 30 mg QD instead of 20 mg 1 5 tablets

## 2020-01-14 DIAGNOSIS — F90.9 ATTENTION DEFICIT HYPERACTIVITY DISORDER (ADHD), UNSPECIFIED ADHD TYPE: ICD-10-CM

## 2020-01-14 RX ORDER — DEXTROAMPHETAMINE SACCHARATE, AMPHETAMINE ASPARTATE, DEXTROAMPHETAMINE SULFATE AND AMPHETAMINE SULFATE 7.5; 7.5; 7.5; 7.5 MG/1; MG/1; MG/1; MG/1
1 TABLET ORAL DAILY
Qty: 30 TABLET | Refills: 0 | Status: SHIPPED | OUTPATIENT
Start: 2020-01-14 | End: 2020-02-14 | Stop reason: SDUPTHER

## 2020-01-31 ENCOUNTER — OFFICE VISIT (OUTPATIENT)
Dept: FAMILY MEDICINE CLINIC | Facility: CLINIC | Age: 35
End: 2020-01-31
Payer: COMMERCIAL

## 2020-01-31 VITALS
DIASTOLIC BLOOD PRESSURE: 72 MMHG | HEIGHT: 66 IN | TEMPERATURE: 98.6 F | HEART RATE: 88 BPM | RESPIRATION RATE: 18 BRPM | BODY MASS INDEX: 23.63 KG/M2 | WEIGHT: 147 LBS | SYSTOLIC BLOOD PRESSURE: 114 MMHG

## 2020-01-31 DIAGNOSIS — F98.8 ATTENTION DEFICIT DISORDER (ADD) WITHOUT HYPERACTIVITY: Primary | ICD-10-CM

## 2020-01-31 PROCEDURE — 99213 OFFICE O/P EST LOW 20 MIN: CPT | Performed by: FAMILY MEDICINE

## 2020-01-31 PROCEDURE — 1036F TOBACCO NON-USER: CPT | Performed by: FAMILY MEDICINE

## 2020-01-31 PROCEDURE — 3008F BODY MASS INDEX DOCD: CPT | Performed by: FAMILY MEDICINE

## 2020-01-31 NOTE — PROGRESS NOTES
Assessment and Plan:    Problem List Items Addressed This Visit     None                 There are no diagnoses linked to this encounter  Subjective:      Patient ID: Annita Ledesma is a 37 y o  female  CC:    Chief Complaint   Patient presents with    Follow-up     6 month follow up       HPI:    Sinuses were bothering her-low grade temp-was out of work Monday, feeling better, got flu shot in October, f/u add-doing well on med,was forgetting some doses on weekend and started setting alarm, no plans at present for child bearing      The following portions of the patient's history were reviewed and updated as appropriate: allergies, current medications, past family history, past medical history, past social history, past surgical history and problem list         Review of Systems   Constitutional: Negative for activity change, appetite change and unexpected weight change  Respiratory: Negative for shortness of breath  Cardiovascular: Negative for chest pain  Neurological: Negative for dizziness and headaches  Psychiatric/Behavioral: Negative for decreased concentration  The patient is not nervous/anxious  Data to review:       Objective:    Vitals:    01/31/20 1359   BP: 114/72   BP Location: Left arm   Patient Position: Sitting   Cuff Size: Adult   Pulse: 88   Resp: 18   Temp: 98 6 °F (37 °C)   TempSrc: Temporal   Weight: 66 7 kg (147 lb)   Height: 5' 6" (1 676 m)        Physical Exam   Constitutional: She appears well-developed and well-nourished  Neck: No thyromegaly present  Cardiovascular: Normal rate, regular rhythm and normal heart sounds  Pulmonary/Chest: Effort normal and breath sounds normal    Musculoskeletal: She exhibits no edema  Lymphadenopathy:     She has no cervical adenopathy  Psychiatric: She has a normal mood and affect  Her behavior is normal    Vitals reviewed

## 2020-02-14 DIAGNOSIS — F90.9 ATTENTION DEFICIT HYPERACTIVITY DISORDER (ADHD), UNSPECIFIED ADHD TYPE: ICD-10-CM

## 2020-02-14 RX ORDER — DEXTROAMPHETAMINE SACCHARATE, AMPHETAMINE ASPARTATE, DEXTROAMPHETAMINE SULFATE AND AMPHETAMINE SULFATE 7.5; 7.5; 7.5; 7.5 MG/1; MG/1; MG/1; MG/1
1 TABLET ORAL DAILY
Qty: 30 TABLET | Refills: 0 | Status: SHIPPED | OUTPATIENT
Start: 2020-02-14 | End: 2020-03-12 | Stop reason: SDUPTHER

## 2020-02-14 NOTE — TELEPHONE ENCOUNTER
----- Message from Letha Rice sent at 2/14/2020  8:31 AM EST -----  Regarding: Prescription Question  Contact: [phone]  Good Morning,        Happy Seaman's Day! I am in need of a prescription refill for my Adderall, 30mg, 30 days  Thank you and have a wonderful day!   Mariel Jj

## 2020-02-14 NOTE — TELEPHONE ENCOUNTER
01/16/2020  1  01/14/2020  DEXTROAMP-AMPHETAMIN 30 MG TAB  30 0  30  MA ARCHANA  [id number]  [facility] (2653)  0   [place]    12/16/2019  1  12/16/2019  DEXTROAMP-AMPHETAMIN 30 MG TAB  30 0  30  MA ARCHANA  [id number]  [facility] (3410)  0   [place]    11/13/2019  1  11/13/2019  DEXTROAMP-AMPHETAMIN 20 MG TAB  45 0  30  MA ARCHANA  [id number]  [facility] (0771)  0   [place]      [place] as above

## 2020-03-12 DIAGNOSIS — F90.9 ATTENTION DEFICIT HYPERACTIVITY DISORDER (ADHD), UNSPECIFIED ADHD TYPE: ICD-10-CM

## 2020-03-12 RX ORDER — DEXTROAMPHETAMINE SACCHARATE, AMPHETAMINE ASPARTATE, DEXTROAMPHETAMINE SULFATE AND AMPHETAMINE SULFATE 7.5; 7.5; 7.5; 7.5 MG/1; MG/1; MG/1; MG/1
1 TABLET ORAL DAILY
Qty: 30 TABLET | Refills: 0 | Status: SHIPPED | OUTPATIENT
Start: 2020-03-12 | End: 2020-04-09 | Stop reason: SDUPTHER

## 2020-03-12 NOTE — TELEPHONE ENCOUNTER
02/14/2020  1  02/14/2020  DEXTROAMP-AMPHETAMIN 30 MG TAB  30 0  30  MA AR[facility]NA  [id number]  [facility] (2653)  0   [place]    01/16/2020  1  01/14/2020  DEXTROAMP-AMPHETAMIN 30 MG TAB  30 0  30  MA ARCHANA  [id number]  [facility] (0158)  0   [place]    12/16/2019  1  12/16/2019  DEXTROAMP-AMPHETAMIN 30 MG TAB  30 0  30  MA ARCHANA  [id number]  [facility] (1396)  0   [place]    11/13/2019  1  11/13/2019  DEXTROAMP-AMPHETAMIN 20 MG TAB  45 0  30  MA ARCHNAA  [id number]  [facility] (4139)  0   [place]      [place] as above

## 2020-03-12 NOTE — TELEPHONE ENCOUNTER
----- Message from Shane Briscoe sent at 3/12/2020  1:07 PM EDT -----  Regarding: Prescription Question  Contact: [phone]  Good Afternoon,        I hope you are doing well during this crazy time! My prescription is due for a refill  Adderall, 30mg, once a day for 30 days  Thank you  Stay safe! !    Brianna Zarate

## 2020-04-09 ENCOUNTER — PATIENT MESSAGE (OUTPATIENT)
Dept: FAMILY MEDICINE CLINIC | Facility: CLINIC | Age: 35
End: 2020-04-09

## 2020-04-09 DIAGNOSIS — F90.9 ATTENTION DEFICIT HYPERACTIVITY DISORDER (ADHD), UNSPECIFIED ADHD TYPE: ICD-10-CM

## 2020-04-09 RX ORDER — DEXTROAMPHETAMINE SACCHARATE, AMPHETAMINE ASPARTATE, DEXTROAMPHETAMINE SULFATE AND AMPHETAMINE SULFATE 7.5; 7.5; 7.5; 7.5 MG/1; MG/1; MG/1; MG/1
1 TABLET ORAL DAILY
Qty: 30 TABLET | Refills: 0 | Status: SHIPPED | OUTPATIENT
Start: 2020-04-09 | End: 2020-05-08 | Stop reason: SDUPTHER

## 2020-05-08 DIAGNOSIS — F90.9 ATTENTION DEFICIT HYPERACTIVITY DISORDER (ADHD), UNSPECIFIED ADHD TYPE: ICD-10-CM

## 2020-05-08 RX ORDER — DEXTROAMPHETAMINE SACCHARATE, AMPHETAMINE ASPARTATE, DEXTROAMPHETAMINE SULFATE AND AMPHETAMINE SULFATE 7.5; 7.5; 7.5; 7.5 MG/1; MG/1; MG/1; MG/1
1 TABLET ORAL DAILY
Qty: 30 TABLET | Refills: 0 | Status: SHIPPED | OUTPATIENT
Start: 2020-05-08 | End: 2020-06-09 | Stop reason: SDUPTHER

## 2020-06-09 DIAGNOSIS — F90.9 ATTENTION DEFICIT HYPERACTIVITY DISORDER (ADHD), UNSPECIFIED ADHD TYPE: ICD-10-CM

## 2020-06-09 RX ORDER — DEXTROAMPHETAMINE SACCHARATE, AMPHETAMINE ASPARTATE, DEXTROAMPHETAMINE SULFATE AND AMPHETAMINE SULFATE 7.5; 7.5; 7.5; 7.5 MG/1; MG/1; MG/1; MG/1
1 TABLET ORAL DAILY
Qty: 30 TABLET | Refills: 0 | Status: SHIPPED | OUTPATIENT
Start: 2020-06-09 | End: 2020-07-09 | Stop reason: SDUPTHER

## 2020-07-09 DIAGNOSIS — F90.9 ATTENTION DEFICIT HYPERACTIVITY DISORDER (ADHD), UNSPECIFIED ADHD TYPE: ICD-10-CM

## 2020-07-09 RX ORDER — DEXTROAMPHETAMINE SACCHARATE, AMPHETAMINE ASPARTATE, DEXTROAMPHETAMINE SULFATE AND AMPHETAMINE SULFATE 7.5; 7.5; 7.5; 7.5 MG/1; MG/1; MG/1; MG/1
1 TABLET ORAL DAILY
Qty: 30 TABLET | Refills: 0 | Status: SHIPPED | OUTPATIENT
Start: 2020-07-09 | End: 2020-08-03 | Stop reason: SDUPTHER

## 2020-07-09 NOTE — TELEPHONE ENCOUNTER
----- Message from Nanette Joy sent at 7/9/2020  8:08 AM EDT -----  Regarding: Non-Urgent Medical Question  Contact: [phone]  Good Morning Dr Rupert Johnson,      Would you please submit a prescription refill for me, 30mg Adderall, 1 time a day, 30 days?      Thank you,   Nanette Joy

## 2020-08-03 ENCOUNTER — OFFICE VISIT (OUTPATIENT)
Dept: FAMILY MEDICINE CLINIC | Facility: CLINIC | Age: 35
End: 2020-08-03
Payer: COMMERCIAL

## 2020-08-03 VITALS
DIASTOLIC BLOOD PRESSURE: 76 MMHG | HEART RATE: 88 BPM | BODY MASS INDEX: 23.37 KG/M2 | TEMPERATURE: 98.4 F | HEIGHT: 66 IN | WEIGHT: 145.4 LBS | SYSTOLIC BLOOD PRESSURE: 112 MMHG

## 2020-08-03 DIAGNOSIS — F90.9 ATTENTION DEFICIT HYPERACTIVITY DISORDER (ADHD), UNSPECIFIED ADHD TYPE: ICD-10-CM

## 2020-08-03 DIAGNOSIS — F98.8 ATTENTION DEFICIT DISORDER (ADD) WITHOUT HYPERACTIVITY: Primary | ICD-10-CM

## 2020-08-03 DIAGNOSIS — L50.9 URTICARIA: ICD-10-CM

## 2020-08-03 PROCEDURE — 3008F BODY MASS INDEX DOCD: CPT | Performed by: FAMILY MEDICINE

## 2020-08-03 PROCEDURE — 3725F SCREEN DEPRESSION PERFORMED: CPT | Performed by: FAMILY MEDICINE

## 2020-08-03 PROCEDURE — 1036F TOBACCO NON-USER: CPT | Performed by: FAMILY MEDICINE

## 2020-08-03 PROCEDURE — 99213 OFFICE O/P EST LOW 20 MIN: CPT | Performed by: FAMILY MEDICINE

## 2020-08-03 RX ORDER — DEXTROAMPHETAMINE SACCHARATE, AMPHETAMINE ASPARTATE, DEXTROAMPHETAMINE SULFATE AND AMPHETAMINE SULFATE 7.5; 7.5; 7.5; 7.5 MG/1; MG/1; MG/1; MG/1
1 TABLET ORAL DAILY
Qty: 30 TABLET | Refills: 0 | Status: SHIPPED | OUTPATIENT
Start: 2020-08-03 | End: 2020-09-22 | Stop reason: SDUPTHER

## 2020-08-03 NOTE — PROGRESS NOTES
Assessment/Plan:    Attention deficit disorder (ADD) without hyperactivity  Doing well on meds    Urticaria  No real use of antihistamine       Diagnoses and all orders for this visit:    Attention deficit disorder (ADD) without hyperactivity    Attention deficit hyperactivity disorder (ADHD), unspecified ADHD type  -     amphetamine-dextroamphetamine (ADDERALL) 30 MG tablet; Take 1 tablet (30 mg total) by mouth dailyMax Daily Amount: 30 mg    Urticaria          Subjective:      Patient ID: Marcello Vallejo is a 37 y o  female  F/u add , doing well, able to focus while working from home, no cp or palpitations, in process of moving      The following portions of the patient's history were reviewed and updated as appropriate: allergies, current medications, past family history, past medical history, past social history, past surgical history and problem list       Review of Systems   Constitutional: Negative for activity change, appetite change and unexpected weight change  Cardiovascular: Negative for chest pain and palpitations  Neurological: Negative for dizziness and headaches  Psychiatric/Behavioral: Negative for decreased concentration  The patient is not nervous/anxious  Objective:      /76 (BP Location: Left arm, Patient Position: Sitting, Cuff Size: Standard)   Pulse 88   Temp 98 4 °F (36 9 °C) (Tympanic)   Ht 5' 6"   Wt 66 kg (145 lb 6 4 oz)   BMI 23 47 kg/m²          Physical Exam   Eyes: Right eye exhibits no discharge  Left eye exhibits no discharge  Neck: Carotid bruit is not present  Cardiovascular: Normal rate, regular rhythm, normal heart sounds and normal pulses  Pulmonary/Chest: Effort normal and breath sounds normal    Abdominal: Normal appearance  Lymphadenopathy:     She has no cervical adenopathy  Psychiatric: Mood normal    Vitals reviewed

## 2020-09-22 DIAGNOSIS — F90.9 ATTENTION DEFICIT HYPERACTIVITY DISORDER (ADHD), UNSPECIFIED ADHD TYPE: ICD-10-CM

## 2020-09-22 RX ORDER — DEXTROAMPHETAMINE SACCHARATE, AMPHETAMINE ASPARTATE, DEXTROAMPHETAMINE SULFATE AND AMPHETAMINE SULFATE 7.5; 7.5; 7.5; 7.5 MG/1; MG/1; MG/1; MG/1
1 TABLET ORAL DAILY
Qty: 30 TABLET | Refills: 0 | Status: SHIPPED | OUTPATIENT
Start: 2020-09-22 | End: 2020-10-26 | Stop reason: SDUPTHER

## 2020-09-22 NOTE — TELEPHONE ENCOUNTER
----- Message from Alejandra Montalvo sent at 9/22/2020  7:11 AM EDT -----  Regarding: Prescription Question  Contact: [phone]  Good Morning Dr Lula Simmons,       I am writing to request a refill of my Adderrall 30mg once a day, 30 day prescription  I had lost my medicine in the move and then found them two weeks ago, which is why I am so late to ask you for a refill  I need the script sent to a different pharmacy as I am no longer in [facility]  Can you please have it sent to [facility] at [address] [place] [address]# [phone]? I have never been to this [facility] but I have been to other [facility] locations in the past     Please do not hesitate to call me if you require any additional information   Cell# [phone]  Respectfully,   Alejandra Montalvo

## 2020-10-26 DIAGNOSIS — F90.9 ATTENTION DEFICIT HYPERACTIVITY DISORDER (ADHD), UNSPECIFIED ADHD TYPE: ICD-10-CM

## 2020-10-26 RX ORDER — DEXTROAMPHETAMINE SACCHARATE, AMPHETAMINE ASPARTATE, DEXTROAMPHETAMINE SULFATE AND AMPHETAMINE SULFATE 7.5; 7.5; 7.5; 7.5 MG/1; MG/1; MG/1; MG/1
1 TABLET ORAL DAILY
Qty: 30 TABLET | Refills: 0 | Status: SHIPPED | OUTPATIENT
Start: 2020-10-26 | End: 2020-11-27 | Stop reason: SDUPTHER

## 2020-11-27 DIAGNOSIS — F90.9 ATTENTION DEFICIT HYPERACTIVITY DISORDER (ADHD), UNSPECIFIED ADHD TYPE: ICD-10-CM

## 2020-11-27 RX ORDER — DEXTROAMPHETAMINE SACCHARATE, AMPHETAMINE ASPARTATE, DEXTROAMPHETAMINE SULFATE AND AMPHETAMINE SULFATE 7.5; 7.5; 7.5; 7.5 MG/1; MG/1; MG/1; MG/1
1 TABLET ORAL DAILY
Qty: 30 TABLET | Refills: 0 | Status: SHIPPED | OUTPATIENT
Start: 2020-11-27 | End: 2020-12-31 | Stop reason: SDUPTHER

## 2020-12-31 DIAGNOSIS — F90.9 ATTENTION DEFICIT HYPERACTIVITY DISORDER (ADHD), UNSPECIFIED ADHD TYPE: ICD-10-CM

## 2020-12-31 RX ORDER — DEXTROAMPHETAMINE SACCHARATE, AMPHETAMINE ASPARTATE, DEXTROAMPHETAMINE SULFATE AND AMPHETAMINE SULFATE 7.5; 7.5; 7.5; 7.5 MG/1; MG/1; MG/1; MG/1
1 TABLET ORAL DAILY
Qty: 30 TABLET | Refills: 0 | Status: SHIPPED | OUTPATIENT
Start: 2020-12-31 | End: 2021-01-28 | Stop reason: SDUPTHER

## 2021-01-28 DIAGNOSIS — F90.9 ATTENTION DEFICIT HYPERACTIVITY DISORDER (ADHD), UNSPECIFIED ADHD TYPE: ICD-10-CM

## 2021-01-28 RX ORDER — DEXTROAMPHETAMINE SACCHARATE, AMPHETAMINE ASPARTATE, DEXTROAMPHETAMINE SULFATE AND AMPHETAMINE SULFATE 7.5; 7.5; 7.5; 7.5 MG/1; MG/1; MG/1; MG/1
1 TABLET ORAL DAILY
Qty: 30 TABLET | Refills: 0 | Status: SHIPPED | OUTPATIENT
Start: 2021-01-28 | End: 2021-03-08 | Stop reason: SDUPTHER

## 2021-02-04 ENCOUNTER — OFFICE VISIT (OUTPATIENT)
Dept: FAMILY MEDICINE CLINIC | Facility: CLINIC | Age: 36
End: 2021-02-04
Payer: COMMERCIAL

## 2021-02-04 VITALS
SYSTOLIC BLOOD PRESSURE: 118 MMHG | HEIGHT: 66 IN | BODY MASS INDEX: 24.91 KG/M2 | WEIGHT: 155 LBS | TEMPERATURE: 98 F | DIASTOLIC BLOOD PRESSURE: 82 MMHG | HEART RATE: 81 BPM | OXYGEN SATURATION: 97 % | RESPIRATION RATE: 18 BRPM

## 2021-02-04 DIAGNOSIS — R23.8 EASY BRUISABILITY: ICD-10-CM

## 2021-02-04 DIAGNOSIS — F98.8 ATTENTION DEFICIT DISORDER (ADD) WITHOUT HYPERACTIVITY: Primary | ICD-10-CM

## 2021-02-04 PROCEDURE — 1036F TOBACCO NON-USER: CPT | Performed by: FAMILY MEDICINE

## 2021-02-04 PROCEDURE — 3008F BODY MASS INDEX DOCD: CPT | Performed by: FAMILY MEDICINE

## 2021-02-04 PROCEDURE — 80307 DRUG TEST PRSMV CHEM ANLYZR: CPT | Performed by: FAMILY MEDICINE

## 2021-02-04 PROCEDURE — 99213 OFFICE O/P EST LOW 20 MIN: CPT | Performed by: FAMILY MEDICINE

## 2021-02-04 NOTE — PROGRESS NOTES
Assessment/Plan:    Attention deficit disorder (ADD) without hyperactivity  Stable on meds       Diagnoses and all orders for this visit:    Attention deficit disorder (ADD) without hyperactivity  -     Toxicology screen, urine          Subjective:      Patient ID: Abelino Bull is a 38 y o  female  F/ub ADD , doing well, vitals stable      The following portions of the patient's history were reviewed and updated as appropriate: allergies, current medications, past family history, past medical history, past social history, past surgical history and problem list       Review of Systems   Constitutional: Positive for unexpected weight change  Negative for activity change, appetite change and fatigue  10 lb gain   Respiratory: Negative for cough  Cardiovascular: Negative for chest pain and palpitations  Neurological: Negative for dizziness and headaches  Hematological: Bruises/bleeds easily  Bruised when moving         Objective:      /82   Pulse 81   Temp 98 °F (36 7 °C) (Temporal)   Resp 18   Ht 5' 6" (1 676 m)   Wt 70 3 kg (155 lb)   SpO2 97%   BMI 25 02 kg/m²          Physical Exam  Vitals signs reviewed  Constitutional:       Appearance: Normal appearance  Cardiovascular:      Rate and Rhythm: Normal rate and regular rhythm  Pulses: Normal pulses  Heart sounds: Normal heart sounds  Pulmonary:      Effort: Pulmonary effort is normal       Breath sounds: Normal breath sounds  Lymphadenopathy:      Cervical: No cervical adenopathy  Neurological:      Mental Status: She is alert  Psychiatric:         Mood and Affect: Mood normal         BMI Counseling: Body mass index is 25 02 kg/m²   The BMI is above normal  Nutrition recommendations include reducing portion sizes, decreasing overall calorie intake, 3-5 servings of fruits/vegetables daily, reducing fast food intake, consuming healthier snacks, moderation in carbohydrate intake, reducing intake of saturated fat and trans fat and reducing intake of cholesterol  Exercise recommendations include exercising 3-5 times per week

## 2021-02-09 LAB
AMPHETAMINES UR QL SCN: POSITIVE
BARBITURATES UR QL SCN: NEGATIVE NG/ML
BENZODIAZ UR QL: NEGATIVE NG/ML
BZE UR QL: NEGATIVE NG/ML
CANNABINOIDS UR QL SCN: POSITIVE
METHADONE UR QL SCN: NEGATIVE NG/ML
OPIATES UR QL: NEGATIVE NG/ML
PCP UR QL: NEGATIVE NG/ML
PROPOXYPH UR QL SCN: NEGATIVE NG/ML

## 2021-03-08 DIAGNOSIS — F90.9 ATTENTION DEFICIT HYPERACTIVITY DISORDER (ADHD), UNSPECIFIED ADHD TYPE: ICD-10-CM

## 2021-03-08 RX ORDER — DEXTROAMPHETAMINE SACCHARATE, AMPHETAMINE ASPARTATE, DEXTROAMPHETAMINE SULFATE AND AMPHETAMINE SULFATE 7.5; 7.5; 7.5; 7.5 MG/1; MG/1; MG/1; MG/1
1 TABLET ORAL DAILY
Qty: 30 TABLET | Refills: 0 | Status: SHIPPED | OUTPATIENT
Start: 2021-03-08 | End: 2021-04-20 | Stop reason: SDUPTHER

## 2021-03-08 NOTE — TELEPHONE ENCOUNTER
----- Message from Joseph Padilla sent at 3/8/2021  7:55 AM EST -----  Regarding: Non-Urgent Medical Question  Contact: [phone]  Happy Monday Dr Oliverio Nieto,      Would you please have my prescription refill called in? Adderall 30mg 1 x day  Thank you,   Have a great week!

## 2021-04-20 ENCOUNTER — PATIENT MESSAGE (OUTPATIENT)
Dept: FAMILY MEDICINE CLINIC | Facility: CLINIC | Age: 36
End: 2021-04-20

## 2021-04-20 DIAGNOSIS — F90.9 ATTENTION DEFICIT HYPERACTIVITY DISORDER (ADHD), UNSPECIFIED ADHD TYPE: ICD-10-CM

## 2021-04-20 RX ORDER — DEXTROAMPHETAMINE SACCHARATE, AMPHETAMINE ASPARTATE, DEXTROAMPHETAMINE SULFATE AND AMPHETAMINE SULFATE 7.5; 7.5; 7.5; 7.5 MG/1; MG/1; MG/1; MG/1
1 TABLET ORAL DAILY
Qty: 30 TABLET | Refills: 0 | Status: SHIPPED | OUTPATIENT
Start: 2021-04-20 | End: 2021-05-20 | Stop reason: SDUPTHER

## 2021-05-20 DIAGNOSIS — F90.9 ATTENTION DEFICIT HYPERACTIVITY DISORDER (ADHD), UNSPECIFIED ADHD TYPE: ICD-10-CM

## 2021-05-20 RX ORDER — DEXTROAMPHETAMINE SACCHARATE, AMPHETAMINE ASPARTATE, DEXTROAMPHETAMINE SULFATE AND AMPHETAMINE SULFATE 7.5; 7.5; 7.5; 7.5 MG/1; MG/1; MG/1; MG/1
1 TABLET ORAL DAILY
Qty: 30 TABLET | Refills: 0 | Status: SHIPPED | OUTPATIENT
Start: 2021-05-20 | End: 2021-06-29 | Stop reason: SDUPTHER

## 2021-06-29 DIAGNOSIS — F90.9 ATTENTION DEFICIT HYPERACTIVITY DISORDER (ADHD), UNSPECIFIED ADHD TYPE: ICD-10-CM

## 2021-06-29 NOTE — TELEPHONE ENCOUNTER
----- Message from Yasmine Morocho sent at 6/29/2021 12:56 PM EDT -----  Regarding: Non-Urgent Medical Question  Contact: [phone]  Would you please call in a refill for my 30 mg 1 x day Adderall? Thanks!   Alexx Lei

## 2021-06-30 RX ORDER — DEXTROAMPHETAMINE SACCHARATE, AMPHETAMINE ASPARTATE, DEXTROAMPHETAMINE SULFATE AND AMPHETAMINE SULFATE 7.5; 7.5; 7.5; 7.5 MG/1; MG/1; MG/1; MG/1
1 TABLET ORAL DAILY
Qty: 30 TABLET | Refills: 0 | Status: SHIPPED | OUTPATIENT
Start: 2021-06-30 | End: 2021-08-04 | Stop reason: SDUPTHER

## 2021-08-04 ENCOUNTER — OFFICE VISIT (OUTPATIENT)
Dept: FAMILY MEDICINE CLINIC | Facility: CLINIC | Age: 36
End: 2021-08-04
Payer: COMMERCIAL

## 2021-08-04 VITALS
BODY MASS INDEX: 25.88 KG/M2 | HEIGHT: 66 IN | RESPIRATION RATE: 18 BRPM | HEART RATE: 80 BPM | SYSTOLIC BLOOD PRESSURE: 120 MMHG | WEIGHT: 161 LBS | DIASTOLIC BLOOD PRESSURE: 80 MMHG

## 2021-08-04 DIAGNOSIS — F90.9 ATTENTION DEFICIT HYPERACTIVITY DISORDER (ADHD), UNSPECIFIED ADHD TYPE: ICD-10-CM

## 2021-08-04 DIAGNOSIS — F98.8 ATTENTION DEFICIT DISORDER (ADD) WITHOUT HYPERACTIVITY: Primary | ICD-10-CM

## 2021-08-04 DIAGNOSIS — L81.8 TATTOOS: ICD-10-CM

## 2021-08-04 PROCEDURE — 99213 OFFICE O/P EST LOW 20 MIN: CPT | Performed by: FAMILY MEDICINE

## 2021-08-04 PROCEDURE — 3008F BODY MASS INDEX DOCD: CPT | Performed by: FAMILY MEDICINE

## 2021-08-04 PROCEDURE — 1036F TOBACCO NON-USER: CPT | Performed by: FAMILY MEDICINE

## 2021-08-04 PROCEDURE — 3725F SCREEN DEPRESSION PERFORMED: CPT | Performed by: FAMILY MEDICINE

## 2021-08-04 RX ORDER — DEXTROAMPHETAMINE SACCHARATE, AMPHETAMINE ASPARTATE, DEXTROAMPHETAMINE SULFATE AND AMPHETAMINE SULFATE 7.5; 7.5; 7.5; 7.5 MG/1; MG/1; MG/1; MG/1
1 TABLET ORAL DAILY
Qty: 30 TABLET | Refills: 0 | Status: SHIPPED | OUTPATIENT
Start: 2021-08-04 | End: 2021-09-07 | Stop reason: SDUPTHER

## 2021-08-04 NOTE — PROGRESS NOTES
Assessment and Plan:    Problem List Items Addressed This Visit        Other    Attention deficit disorder (ADD) without hyperactivity - Primary     utd toxicology screen, working from is keeping her more focused and productive, less anxiety         Relevant Medications    amphetamine-dextroamphetamine (ADDERALL) 30 MG tablet      Other Visit Diagnoses     Tattoos        Relevant Orders    Hepatitis C antibody    Attention deficit hyperactivity disorder (ADHD), unspecified ADHD type        Relevant Medications    amphetamine-dextroamphetamine (ADDERALL) 30 MG tablet                 Diagnoses and all orders for this visit:    Attention deficit disorder (ADD) without hyperactivity    Tattoos  -     Hepatitis C antibody; Future    Attention deficit hyperactivity disorder (ADHD), unspecified ADHD type  -     amphetamine-dextroamphetamine (ADDERALL) 30 MG tablet; Take 1 tablet (30 mg total) by mouth dailyMax Daily Amount: 30 mg              Subjective:      Patient ID: Stpehy Aguilar is a 38 y o  female  CC:    Chief Complaint   Patient presents with    Follow-up     6 month        HPI:    Here for f/u add, interested in hpv vaccine-will check with insurance,feels well, got covid shot-2nd nade her feel "off"      The following portions of the patient's history were reviewed and updated as appropriate: allergies, current medications, past family history, past medical history, past social history, past surgical history and problem list       Review of Systems   Constitutional: Negative for activity change, appetite change, fatigue and unexpected weight change  Respiratory: Negative for shortness of breath  Cardiovascular: Negative for chest pain  Neurological: Negative for dizziness and headaches  Psychiatric/Behavioral: Negative for decreased concentration           Data to review:       Objective:    Vitals:    08/04/21 1007   BP: 120/80   BP Location: Left arm   Patient Position: Sitting   Cuff Size: Adult Pulse: 80   Resp: 18   Weight: 73 kg (161 lb)   Height: 5' 6" (1 676 m)        Physical Exam  Vitals reviewed  Constitutional:       Appearance: Normal appearance  Cardiovascular:      Rate and Rhythm: Normal rate and regular rhythm  Pulses: Normal pulses  Heart sounds: Normal heart sounds  Pulmonary:      Effort: Pulmonary effort is normal       Breath sounds: Normal breath sounds  Lymphadenopathy:      Cervical: No cervical adenopathy  Neurological:      Mental Status: She is alert     Psychiatric:         Mood and Affect: Mood normal          Behavior: Behavior normal

## 2021-09-07 DIAGNOSIS — F90.9 ATTENTION DEFICIT HYPERACTIVITY DISORDER (ADHD), UNSPECIFIED ADHD TYPE: ICD-10-CM

## 2021-09-07 RX ORDER — DEXTROAMPHETAMINE SACCHARATE, AMPHETAMINE ASPARTATE, DEXTROAMPHETAMINE SULFATE AND AMPHETAMINE SULFATE 7.5; 7.5; 7.5; 7.5 MG/1; MG/1; MG/1; MG/1
1 TABLET ORAL DAILY
Qty: 30 TABLET | Refills: 0 | Status: SHIPPED | OUTPATIENT
Start: 2021-09-07 | End: 2021-10-11 | Stop reason: SDUPTHER

## 2021-10-11 DIAGNOSIS — F90.9 ATTENTION DEFICIT HYPERACTIVITY DISORDER (ADHD), UNSPECIFIED ADHD TYPE: ICD-10-CM

## 2021-10-11 RX ORDER — DEXTROAMPHETAMINE SACCHARATE, AMPHETAMINE ASPARTATE, DEXTROAMPHETAMINE SULFATE AND AMPHETAMINE SULFATE 7.5; 7.5; 7.5; 7.5 MG/1; MG/1; MG/1; MG/1
1 TABLET ORAL DAILY
Qty: 30 TABLET | Refills: 0 | Status: SHIPPED | OUTPATIENT
Start: 2021-10-11 | End: 2021-11-11 | Stop reason: SDUPTHER

## 2021-11-11 DIAGNOSIS — F90.9 ATTENTION DEFICIT HYPERACTIVITY DISORDER (ADHD), UNSPECIFIED ADHD TYPE: ICD-10-CM

## 2021-11-11 RX ORDER — DEXTROAMPHETAMINE SACCHARATE, AMPHETAMINE ASPARTATE, DEXTROAMPHETAMINE SULFATE AND AMPHETAMINE SULFATE 7.5; 7.5; 7.5; 7.5 MG/1; MG/1; MG/1; MG/1
1 TABLET ORAL DAILY
Qty: 30 TABLET | Refills: 0 | Status: SHIPPED | OUTPATIENT
Start: 2021-11-11 | End: 2021-12-23 | Stop reason: SDUPTHER

## 2021-12-23 ENCOUNTER — PATIENT MESSAGE (OUTPATIENT)
Dept: FAMILY MEDICINE CLINIC | Facility: CLINIC | Age: 36
End: 2021-12-23

## 2021-12-23 DIAGNOSIS — F90.9 ATTENTION DEFICIT HYPERACTIVITY DISORDER (ADHD), UNSPECIFIED ADHD TYPE: ICD-10-CM

## 2021-12-23 RX ORDER — DEXTROAMPHETAMINE SACCHARATE, AMPHETAMINE ASPARTATE, DEXTROAMPHETAMINE SULFATE AND AMPHETAMINE SULFATE 7.5; 7.5; 7.5; 7.5 MG/1; MG/1; MG/1; MG/1
1 TABLET ORAL DAILY
Qty: 30 TABLET | Refills: 0 | Status: SHIPPED | OUTPATIENT
Start: 2021-12-23 | End: 2022-01-24 | Stop reason: SDUPTHER

## 2022-01-24 ENCOUNTER — PATIENT MESSAGE (OUTPATIENT)
Dept: FAMILY MEDICINE CLINIC | Facility: CLINIC | Age: 37
End: 2022-01-24

## 2022-01-24 DIAGNOSIS — F90.9 ATTENTION DEFICIT HYPERACTIVITY DISORDER (ADHD), UNSPECIFIED ADHD TYPE: ICD-10-CM

## 2022-01-24 RX ORDER — DEXTROAMPHETAMINE SACCHARATE, AMPHETAMINE ASPARTATE, DEXTROAMPHETAMINE SULFATE AND AMPHETAMINE SULFATE 7.5; 7.5; 7.5; 7.5 MG/1; MG/1; MG/1; MG/1
1 TABLET ORAL DAILY
Qty: 30 TABLET | Refills: 0 | Status: SHIPPED | OUTPATIENT
Start: 2022-01-24 | End: 2022-02-26 | Stop reason: SDUPTHER

## 2022-02-04 ENCOUNTER — OFFICE VISIT (OUTPATIENT)
Dept: FAMILY MEDICINE CLINIC | Facility: CLINIC | Age: 37
End: 2022-02-04
Payer: COMMERCIAL

## 2022-02-04 VITALS
HEIGHT: 66 IN | DIASTOLIC BLOOD PRESSURE: 88 MMHG | HEART RATE: 91 BPM | SYSTOLIC BLOOD PRESSURE: 110 MMHG | BODY MASS INDEX: 23.98 KG/M2 | TEMPERATURE: 98.9 F | WEIGHT: 149.2 LBS

## 2022-02-04 DIAGNOSIS — L50.9 URTICARIA: ICD-10-CM

## 2022-02-04 DIAGNOSIS — L81.8 TATTOOS: ICD-10-CM

## 2022-02-04 DIAGNOSIS — G43.709 CHRONIC MIGRAINE WITHOUT AURA WITHOUT STATUS MIGRAINOSUS, NOT INTRACTABLE: ICD-10-CM

## 2022-02-04 DIAGNOSIS — Z23 NEED FOR INFLUENZA VACCINATION: ICD-10-CM

## 2022-02-04 DIAGNOSIS — F98.8 ATTENTION DEFICIT DISORDER (ADD) WITHOUT HYPERACTIVITY: Primary | ICD-10-CM

## 2022-02-04 PROCEDURE — 1036F TOBACCO NON-USER: CPT

## 2022-02-04 PROCEDURE — 3008F BODY MASS INDEX DOCD: CPT

## 2022-02-04 PROCEDURE — 99213 OFFICE O/P EST LOW 20 MIN: CPT

## 2022-02-04 PROCEDURE — 90471 IMMUNIZATION ADMIN: CPT

## 2022-02-04 PROCEDURE — 80307 DRUG TEST PRSMV CHEM ANLYZR: CPT | Performed by: FAMILY MEDICINE

## 2022-02-04 PROCEDURE — 90686 IIV4 VACC NO PRSV 0.5 ML IM: CPT

## 2022-02-04 NOTE — PROGRESS NOTES
Assessment and Plan:    Problem List Items Addressed This Visit        Cardiovascular and Mediastinum    Migraine     Taking excedrin migraine, B6 200 mg/day, magnesium 500 mg/day            Musculoskeletal and Integument    Urticaria    Relevant Orders    Comprehensive metabolic panel    CBC and differential       Other    Attention deficit disorder (ADD) without hyperactivity - Primary     Needs uds, no early refills per pdmp         Relevant Orders    Toxicology screen, urine      Other Visit Diagnoses     Need for influenza vaccination        Relevant Orders    influenza vaccine, quadrivalent, 0 5 mL, preservative-free, for adult and pediatric patients 6 mos+ (AFLURIA, FLUARIX, FLULAVAL, FLUZONE)    Tattoos        Relevant Orders    Hepatitis C antibody                 Diagnoses and all orders for this visit:    Attention deficit disorder (ADD) without hyperactivity  -     Toxicology screen, urine    Chronic migraine without aura without status migrainosus, not intractable    Need for influenza vaccination  -     influenza vaccine, quadrivalent, 0 5 mL, preservative-free, for adult and pediatric patients 6 mos+ (AFLURIA, FLUARIX, FLULAVAL, FLUZONE)    Tattoos  -     Hepatitis C antibody; Future    Urticaria  -     Comprehensive metabolic panel; Future  -     CBC and differential; Future              Subjective:      Patient ID: Yasmine Morocho is a 43 y o  female  CC:    Chief Complaint   Patient presents with    Follow-up     Patient here for 6 months follow up  Pt will like to discuss frequent migraines, pt state 4 times a month         HPI:    F/u add, having more frequent migraines, not just menstrual, possibly weather related      The following portions of the patient's history were reviewed and updated as appropriate: allergies, current medications, past family history, past medical history, past social history, past surgical history and problem list       Review of Systems   Constitutional: Negative for activity change, appetite change and fatigue  Respiratory: Negative for shortness of breath  Cardiovascular: Negative for chest pain  Neurological: Positive for headaches  Negative for dizziness  Psychiatric/Behavioral: Positive for decreased concentration  Data to review:       Objective:    Vitals:    02/04/22 0734   BP: 110/88   BP Location: Right arm   Patient Position: Sitting   Cuff Size: Adult   Pulse: 91   Temp: 98 9 °F (37 2 °C)   TempSrc: Temporal   Weight: 67 7 kg (149 lb 3 2 oz)   Height: 5' 6" (1 676 m)        Physical Exam  Vitals reviewed  Constitutional:       Appearance: Normal appearance  Cardiovascular:      Rate and Rhythm: Normal rate and regular rhythm  Pulses: Normal pulses  Heart sounds: Normal heart sounds  Pulmonary:      Effort: Pulmonary effort is normal       Breath sounds: Normal breath sounds  Neurological:      Mental Status: She is alert  Psychiatric:         Mood and Affect: Mood normal              BMI Counseling: Body mass index is 24 08 kg/m²  The BMI is above normal  Nutrition recommendations include reducing portion sizes, decreasing overall calorie intake, 3-5 servings of fruits/vegetables daily, reducing fast food intake and consuming healthier snacks  Exercise recommendations include exercising 3-5 times per week

## 2022-02-25 ENCOUNTER — PATIENT MESSAGE (OUTPATIENT)
Dept: FAMILY MEDICINE CLINIC | Facility: CLINIC | Age: 37
End: 2022-02-25

## 2022-02-25 DIAGNOSIS — F90.9 ATTENTION DEFICIT HYPERACTIVITY DISORDER (ADHD), UNSPECIFIED ADHD TYPE: ICD-10-CM

## 2022-02-27 RX ORDER — DEXTROAMPHETAMINE SACCHARATE, AMPHETAMINE ASPARTATE, DEXTROAMPHETAMINE SULFATE AND AMPHETAMINE SULFATE 7.5; 7.5; 7.5; 7.5 MG/1; MG/1; MG/1; MG/1
1 TABLET ORAL DAILY
Qty: 30 TABLET | Refills: 0 | Status: SHIPPED | OUTPATIENT
Start: 2022-02-27 | End: 2022-03-28 | Stop reason: SDUPTHER

## 2022-03-28 ENCOUNTER — PATIENT MESSAGE (OUTPATIENT)
Dept: FAMILY MEDICINE CLINIC | Facility: CLINIC | Age: 37
End: 2022-03-28

## 2022-03-28 DIAGNOSIS — F90.9 ATTENTION DEFICIT HYPERACTIVITY DISORDER (ADHD), UNSPECIFIED ADHD TYPE: ICD-10-CM

## 2022-03-28 RX ORDER — DEXTROAMPHETAMINE SACCHARATE, AMPHETAMINE ASPARTATE, DEXTROAMPHETAMINE SULFATE AND AMPHETAMINE SULFATE 7.5; 7.5; 7.5; 7.5 MG/1; MG/1; MG/1; MG/1
1 TABLET ORAL DAILY
Qty: 30 TABLET | Refills: 0 | Status: SHIPPED | OUTPATIENT
Start: 2022-03-28 | End: 2022-04-29 | Stop reason: SDUPTHER

## 2022-04-29 DIAGNOSIS — F90.9 ATTENTION DEFICIT HYPERACTIVITY DISORDER (ADHD), UNSPECIFIED ADHD TYPE: ICD-10-CM

## 2022-04-29 RX ORDER — DEXTROAMPHETAMINE SACCHARATE, AMPHETAMINE ASPARTATE, DEXTROAMPHETAMINE SULFATE AND AMPHETAMINE SULFATE 7.5; 7.5; 7.5; 7.5 MG/1; MG/1; MG/1; MG/1
1 TABLET ORAL DAILY
Qty: 30 TABLET | Refills: 0 | Status: SHIPPED | OUTPATIENT
Start: 2022-04-29 | End: 2022-06-13 | Stop reason: SDUPTHER

## 2022-06-13 ENCOUNTER — PATIENT MESSAGE (OUTPATIENT)
Dept: FAMILY MEDICINE CLINIC | Facility: CLINIC | Age: 37
End: 2022-06-13

## 2022-06-13 DIAGNOSIS — F90.9 ATTENTION DEFICIT HYPERACTIVITY DISORDER (ADHD), UNSPECIFIED ADHD TYPE: ICD-10-CM

## 2022-06-13 RX ORDER — DEXTROAMPHETAMINE SACCHARATE, AMPHETAMINE ASPARTATE, DEXTROAMPHETAMINE SULFATE AND AMPHETAMINE SULFATE 7.5; 7.5; 7.5; 7.5 MG/1; MG/1; MG/1; MG/1
1 TABLET ORAL DAILY
Qty: 30 TABLET | Refills: 0 | Status: SHIPPED | OUTPATIENT
Start: 2022-06-13 | End: 2022-07-13 | Stop reason: SDUPTHER

## 2022-07-13 ENCOUNTER — PATIENT MESSAGE (OUTPATIENT)
Dept: FAMILY MEDICINE CLINIC | Facility: CLINIC | Age: 37
End: 2022-07-13

## 2022-07-13 DIAGNOSIS — F90.9 ATTENTION DEFICIT HYPERACTIVITY DISORDER (ADHD), UNSPECIFIED ADHD TYPE: ICD-10-CM

## 2022-07-13 RX ORDER — DEXTROAMPHETAMINE SACCHARATE, AMPHETAMINE ASPARTATE, DEXTROAMPHETAMINE SULFATE AND AMPHETAMINE SULFATE 7.5; 7.5; 7.5; 7.5 MG/1; MG/1; MG/1; MG/1
1 TABLET ORAL DAILY
Qty: 30 TABLET | Refills: 0 | Status: SHIPPED | OUTPATIENT
Start: 2022-07-13 | End: 2022-08-26 | Stop reason: SDUPTHER

## 2022-08-05 ENCOUNTER — OFFICE VISIT (OUTPATIENT)
Dept: FAMILY MEDICINE CLINIC | Facility: CLINIC | Age: 37
End: 2022-08-05
Payer: COMMERCIAL

## 2022-08-05 VITALS
WEIGHT: 149.4 LBS | DIASTOLIC BLOOD PRESSURE: 70 MMHG | HEIGHT: 66 IN | SYSTOLIC BLOOD PRESSURE: 108 MMHG | HEART RATE: 92 BPM | BODY MASS INDEX: 24.01 KG/M2

## 2022-08-05 DIAGNOSIS — F98.8 ATTENTION DEFICIT DISORDER (ADD) WITHOUT HYPERACTIVITY: Primary | ICD-10-CM

## 2022-08-05 DIAGNOSIS — Z13.6 SCREENING FOR CARDIOVASCULAR CONDITION: ICD-10-CM

## 2022-08-05 DIAGNOSIS — H61.21 IMPACTED CERUMEN OF RIGHT EAR: ICD-10-CM

## 2022-08-05 LAB — HCV AB SER-ACNC: NON REACTIVE

## 2022-08-05 PROCEDURE — 99213 OFFICE O/P EST LOW 20 MIN: CPT | Performed by: FAMILY MEDICINE

## 2022-08-05 PROCEDURE — 3725F SCREEN DEPRESSION PERFORMED: CPT | Performed by: FAMILY MEDICINE

## 2022-08-05 NOTE — PROGRESS NOTES
Assessment and Plan:    Problem List Items Addressed This Visit        Other    Attention deficit disorder (ADD) without hyperactivity - Primary     utd uds,okay on meds           Other Visit Diagnoses     Impacted cerumen of right ear        Screening for cardiovascular condition        Relevant Orders    Lipid panel                 Diagnoses and all orders for this visit:    Attention deficit disorder (ADD) without hyperactivity    Impacted cerumen of right ear    Screening for cardiovascular condition  -     Lipid panel; Future            Subjective:      Patient ID: Lisha Kenyon is a 43 y o  female  CC:    Chief Complaint   Patient presents with    Follow-up     Pt is present today for 6 month follow up    Ear Fullness     Right ear       HPI:    F/u add  Doing well on meds, r ear  Blocked up after swimming 2 weeks ago      The following portions of the patient's history were reviewed and updated as appropriate: allergies, current medications, past family history, past medical history, past social history, past surgical history and problem list       Review of Systems   Constitutional: Negative for activity change, appetite change and fatigue  HENT:        Ear pressure   Respiratory: Negative for shortness of breath  Cardiovascular: Negative for chest pain  Neurological: Negative for dizziness and headaches  Psychiatric/Behavioral: Negative for decreased concentration  The patient is not nervous/anxious  Data to review:       Objective:    Vitals:    08/05/22 0807   BP: 108/70   BP Location: Left arm   Patient Position: Sitting   Cuff Size: Standard   Pulse: 92   Weight: 67 8 kg (149 lb 6 4 oz)   Height: 5' 6" (1 676 m)        Physical Exam  Vitals reviewed  Constitutional:       Appearance: Normal appearance  HENT:      Right Ear: There is impacted cerumen  Left Ear: Tympanic membrane normal    Cardiovascular:      Rate and Rhythm: Normal rate and regular rhythm        Pulses: Normal pulses  Heart sounds: Normal heart sounds  Pulmonary:      Effort: Pulmonary effort is normal       Breath sounds: Normal breath sounds  Neurological:      Mental Status: She is alert     Psychiatric:         Mood and Affect: Mood normal

## 2022-08-26 DIAGNOSIS — F90.9 ATTENTION DEFICIT HYPERACTIVITY DISORDER (ADHD), UNSPECIFIED ADHD TYPE: ICD-10-CM

## 2022-08-26 RX ORDER — DEXTROAMPHETAMINE SACCHARATE, AMPHETAMINE ASPARTATE, DEXTROAMPHETAMINE SULFATE AND AMPHETAMINE SULFATE 7.5; 7.5; 7.5; 7.5 MG/1; MG/1; MG/1; MG/1
1 TABLET ORAL DAILY
Qty: 30 TABLET | Refills: 0 | Status: SHIPPED | OUTPATIENT
Start: 2022-08-26 | End: 2022-09-27 | Stop reason: SDUPTHER

## 2022-08-26 NOTE — TELEPHONE ENCOUNTER
Last OV with Office: 8/5/2022   Last visit with PCP : Visit date not found      Next visit with the Office :12/9/2022   Next visit with PCP : Visit date not found

## 2022-08-26 NOTE — TELEPHONE ENCOUNTER
----- Message from Lisha Kenyon sent at 8/26/2022 11:14 AM EDT -----  Regarding: Refill Request  Would you please call in a refill for my Adderall 30 mg 1 x day?

## 2022-09-27 DIAGNOSIS — F90.9 ATTENTION DEFICIT HYPERACTIVITY DISORDER (ADHD), UNSPECIFIED ADHD TYPE: ICD-10-CM

## 2022-09-27 RX ORDER — DEXTROAMPHETAMINE SACCHARATE, AMPHETAMINE ASPARTATE, DEXTROAMPHETAMINE SULFATE AND AMPHETAMINE SULFATE 7.5; 7.5; 7.5; 7.5 MG/1; MG/1; MG/1; MG/1
1 TABLET ORAL DAILY
Qty: 30 TABLET | Refills: 0 | Status: SHIPPED | OUTPATIENT
Start: 2022-09-27 | End: 2022-10-27

## 2022-09-27 NOTE — TELEPHONE ENCOUNTER
Requested Prescriptions     Pending Prescriptions Disp Refills    amphetamine-dextroamphetamine (ADDERALL) 30 MG tablet 30 tablet 0     Sig: Take 1 tablet (30 mg total) by mouth daily Max Daily Amount: 30 mg     LOV 8/5/22, F/U 12/9/22, Labs active

## 2022-11-02 DIAGNOSIS — F90.9 ATTENTION DEFICIT HYPERACTIVITY DISORDER (ADHD), UNSPECIFIED ADHD TYPE: ICD-10-CM

## 2022-11-02 RX ORDER — DEXTROAMPHETAMINE SACCHARATE, AMPHETAMINE ASPARTATE, DEXTROAMPHETAMINE SULFATE AND AMPHETAMINE SULFATE 7.5; 7.5; 7.5; 7.5 MG/1; MG/1; MG/1; MG/1
1 TABLET ORAL DAILY
Qty: 30 TABLET | Refills: 0 | Status: SHIPPED | OUTPATIENT
Start: 2022-11-02 | End: 2022-12-02

## 2022-12-02 DIAGNOSIS — F90.9 ATTENTION DEFICIT HYPERACTIVITY DISORDER (ADHD), UNSPECIFIED ADHD TYPE: ICD-10-CM

## 2022-12-02 RX ORDER — DEXTROAMPHETAMINE SACCHARATE, AMPHETAMINE ASPARTATE, DEXTROAMPHETAMINE SULFATE AND AMPHETAMINE SULFATE 7.5; 7.5; 7.5; 7.5 MG/1; MG/1; MG/1; MG/1
1 TABLET ORAL DAILY
Qty: 30 TABLET | Refills: 0 | Status: SHIPPED | OUTPATIENT
Start: 2022-12-02 | End: 2023-01-01

## 2022-12-09 ENCOUNTER — ANNUAL EXAM (OUTPATIENT)
Dept: FAMILY MEDICINE CLINIC | Facility: CLINIC | Age: 37
End: 2022-12-09

## 2022-12-09 VITALS
WEIGHT: 147.13 LBS | TEMPERATURE: 99.8 F | BODY MASS INDEX: 23.65 KG/M2 | HEART RATE: 88 BPM | HEIGHT: 66 IN | OXYGEN SATURATION: 98 % | DIASTOLIC BLOOD PRESSURE: 64 MMHG | SYSTOLIC BLOOD PRESSURE: 104 MMHG

## 2022-12-09 DIAGNOSIS — Z01.419 ENCOUNTER FOR GYNECOLOGICAL EXAMINATION WITHOUT ABNORMAL FINDING: ICD-10-CM

## 2022-12-09 DIAGNOSIS — Z23 NEED FOR INFLUENZA VACCINATION: Primary | ICD-10-CM

## 2022-12-09 NOTE — PROGRESS NOTES
Subjective     Emeli Mascorro is a 42 y o  female here for a routine exam   Current complaints: none  Personal health questionnaire reviewed: yes  Had  Bad cramps a few  Cycles ago but  settled down-? Ovarian cyst     Gynecologic History  No LMP recorded  12/3/22  Contraception: abstinence  Last Pap: 7/21/17 Results were: normal  Last mammogram: none     Obstetric History  OB History   No obstetric history on file     G0PO      The following portions of the patient's history were reviewed and updated as appropriate: allergies, current medications, past family history, past medical history, past social history, past surgical history and problem list     Review of Systems  negative except decreased concentration c/w add      Objective     /64 (BP Location: Right arm, Patient Position: Sitting, Cuff Size: Standard)   Pulse 88   Temp 99 8 °F (37 7 °C) (Temporal)   Ht 5' 6" (1 676 m)   Wt 66 7 kg (147 lb 2 oz)   SpO2 98%   BMI 23 75 kg/m²     General Appearance:    Alert, cooperative, no distress, appears stated age   Head:    Normocephalic, without obvious abnormality, atraumatic   Eyes:      Ears:     Nose:    Throat:      Neck:   Supple, symmetrical, trachea midline, no adenopathy;     thyroid:  no enlargement/tenderness/nodules; n   Back:     Symmetric, no curvature, ROM normal, no CVA tenderness   Lungs:     Clear to auscultation bilaterally, respirations unlabored   Chest Wall:    No tenderness or deformity    Heart:    Regular rate and rhythm, S1 and S2 normal, no murmur, rub   or gallop   Breast Exam:    No tenderness, masses, or nipple abnormality   Abdomen:     Soft, non-tender, bowel sounds active all four quadrants,     no masses, no organomegaly   Genitalia:    Normal female without lesion, discharge or tenderness,pap  Obtained, nulliparous, nl uterus and  ovaries   Rectal:       Extremities:   Extremities normal, atraumatic, no cyanosis or edema   Pulses:   2+ and symmetric all extremities   Skin: Skin color, texture, turgor normal, no rashes or lesions   Lymph nodes:   Cervical, supraclavicular, and axillary nodes normal   Neurologic: t         Assessment     Healthy female exam       Plan     Education reviewed: calcium supplements, self breast exams, skin cancer screening and weight bearing exercise  Contraception: abstinence  Name: Arben Corona      : 1985      MRN: [id number]  Encounter Provider: Nayan Howard MD  Encounter Date: 2022   Encounter department: [facility]    Assessment & Plan     1   Need for influenza vaccination  -     influenza vaccine, quadrivalent, 0 5 mL, preservative-free, for adult and pediatric patients 6 mos+ (AFLURIA, FLUARIX, FLULAVAL, FLUZONE)    2  Encounter for gynecological examination without abnormal finding  -     Liquid-based pap, screening         Subjective      HPI  Review of Systems    Current Outpatient Medications on File Prior to Visit   Medication Sig   • amphetamine-dextroamphetamine (ADDERALL) 30 MG tablet Take 1 tablet (30 mg total) by mouth daily Max Daily Amount: 30 mg       Objective     /64 (BP Location: Right arm, Patient Position: Sitting, Cuff Size: Standard)   Pulse 88   Temp 99 8 °F (37 7 °C) (Temporal)   Ht 5' 6" (1 676 m)   Wt 66 7 kg (147 lb 2 oz)   SpO2 98%   BMI 23 75 kg/m²     Physical Exam  Nayan Howard MD

## 2022-12-19 LAB
LAB AP GYN PRIMARY INTERPRETATION: NORMAL
LAB AP LMP: NORMAL
Lab: NORMAL

## 2023-01-04 DIAGNOSIS — F90.9 ATTENTION DEFICIT HYPERACTIVITY DISORDER (ADHD), UNSPECIFIED ADHD TYPE: ICD-10-CM

## 2023-01-05 RX ORDER — DEXTROAMPHETAMINE SACCHARATE, AMPHETAMINE ASPARTATE, DEXTROAMPHETAMINE SULFATE AND AMPHETAMINE SULFATE 7.5; 7.5; 7.5; 7.5 MG/1; MG/1; MG/1; MG/1
1 TABLET ORAL DAILY
Qty: 30 TABLET | Refills: 0 | Status: SHIPPED | OUTPATIENT
Start: 2023-01-05 | End: 2023-02-04

## 2023-03-09 DIAGNOSIS — F90.9 ATTENTION DEFICIT HYPERACTIVITY DISORDER (ADHD), UNSPECIFIED ADHD TYPE: ICD-10-CM

## 2023-03-09 RX ORDER — DEXTROAMPHETAMINE SACCHARATE, AMPHETAMINE ASPARTATE, DEXTROAMPHETAMINE SULFATE AND AMPHETAMINE SULFATE 7.5; 7.5; 7.5; 7.5 MG/1; MG/1; MG/1; MG/1
1 TABLET ORAL DAILY
Qty: 30 TABLET | Refills: 0 | Status: SHIPPED | OUTPATIENT
Start: 2023-03-09 | End: 2023-04-08

## 2023-05-10 DIAGNOSIS — F90.9 ATTENTION DEFICIT HYPERACTIVITY DISORDER (ADHD), UNSPECIFIED ADHD TYPE: ICD-10-CM

## 2023-05-10 RX ORDER — DEXTROAMPHETAMINE SACCHARATE, AMPHETAMINE ASPARTATE, DEXTROAMPHETAMINE SULFATE AND AMPHETAMINE SULFATE 7.5; 7.5; 7.5; 7.5 MG/1; MG/1; MG/1; MG/1
1 TABLET ORAL DAILY
Qty: 30 TABLET | Refills: 0 | Status: SHIPPED | OUTPATIENT
Start: 2023-05-10 | End: 2023-06-09

## 2023-06-09 ENCOUNTER — OFFICE VISIT (OUTPATIENT)
Dept: FAMILY MEDICINE CLINIC | Facility: CLINIC | Age: 38
End: 2023-06-09
Payer: COMMERCIAL

## 2023-06-09 VITALS
SYSTOLIC BLOOD PRESSURE: 100 MMHG | DIASTOLIC BLOOD PRESSURE: 70 MMHG | HEIGHT: 66 IN | WEIGHT: 160.2 LBS | OXYGEN SATURATION: 99 % | BODY MASS INDEX: 25.75 KG/M2 | HEART RATE: 88 BPM

## 2023-06-09 DIAGNOSIS — F98.8 ATTENTION DEFICIT DISORDER (ADD) WITHOUT HYPERACTIVITY: Primary | ICD-10-CM

## 2023-06-09 DIAGNOSIS — F90.9 ATTENTION DEFICIT HYPERACTIVITY DISORDER (ADHD), UNSPECIFIED ADHD TYPE: ICD-10-CM

## 2023-06-09 DIAGNOSIS — Z12.31 ENCOUNTER FOR SCREENING MAMMOGRAM FOR BREAST CANCER: ICD-10-CM

## 2023-06-09 PROCEDURE — 80307 DRUG TEST PRSMV CHEM ANLYZR: CPT | Performed by: FAMILY MEDICINE

## 2023-06-09 PROCEDURE — 99213 OFFICE O/P EST LOW 20 MIN: CPT | Performed by: FAMILY MEDICINE

## 2023-06-09 RX ORDER — DEXTROAMPHETAMINE SACCHARATE, AMPHETAMINE ASPARTATE, DEXTROAMPHETAMINE SULFATE AND AMPHETAMINE SULFATE 7.5; 7.5; 7.5; 7.5 MG/1; MG/1; MG/1; MG/1
1 TABLET ORAL DAILY
Qty: 30 TABLET | Refills: 0 | Status: SHIPPED | OUTPATIENT
Start: 2023-06-09 | End: 2023-07-09

## 2023-06-09 NOTE — PROGRESS NOTES
"Name: Lux Perera      : 1985      MRN: [id number]  Encounter Provider: Alirio Degroot MD  Encounter Date: 2023   Encounter department: [facility]    Assessment & Plan     1  Attention deficit disorder (ADD) without hyperactivity  Assessment & Plan:  Refill med, get  Uds, no early  Refills  Per  PDMP    Orders:  -     Toxicology screen, urine    2  BMI 25 0-25 9,adult    3  Attention deficit hyperactivity disorder (ADHD), unspecified ADHD type  -     amphetamine-dextroamphetamine (ADDERALL) 30 MG tablet; Take 1 tablet (30 mg total) by mouth daily Max Daily Amount: 30 mg    4  Encounter for screening mammogram for breast cancer  -     Mammo screening bilateral w 3d & cad; Future; Expected date: 2023         Subjective      F/u add, no early  Refills, due  For  tox screen    Review of Systems   Constitutional: Negative for activity change, appetite change and fatigue  Respiratory: Negative for shortness of breath  Cardiovascular: Negative for chest pain and palpitations  Neurological: Positive for headaches  Due  To menses   Psychiatric/Behavioral: Negative for decreased concentration and dysphoric mood  The patient is not nervous/anxious  Current Outpatient Medications on File Prior to Visit   Medication Sig   • [DISCONTINUED] amphetamine-dextroamphetamine (ADDERALL) 30 MG tablet Take 1 tablet (30 mg total) by mouth daily Max Daily Amount: 30 mg       Objective     /70 (BP Location: Right arm, Patient Position: Sitting, Cuff Size: Standard)   Pulse 88   Ht 5' 6\" (1 676 m)   Wt 72 7 kg (160 lb 3 2 oz)   SpO2 99%   BMI 25 86 kg/m²     Physical Exam  Vitals reviewed  Constitutional:       Appearance: Normal appearance  Cardiovascular:      Rate and Rhythm: Normal rate and regular rhythm  Pulses: Normal pulses  Heart sounds: Normal heart sounds     Pulmonary:      Effort: Pulmonary effort is normal       Breath sounds: Normal breath " sounds  Musculoskeletal:      Right lower leg: No edema  Left lower leg: No edema  Lymphadenopathy:      Cervical: No cervical adenopathy  Neurological:      Mental Status: She is alert  Psychiatric:         Mood and Affect: Mood normal        Duke Lamb MD  BMI Counseling: Body mass index is 25 86 kg/m²  The BMI is above normal  Nutrition recommendations include reducing portion sizes, decreasing overall calorie intake, 3-5 servings of fruits/vegetables daily, reducing fast food intake and consuming healthier snacks  Exercise recommendations include exercising 3-5 times per week

## 2023-06-09 NOTE — PATIENT INSTRUCTIONS
Needs uds, refill med  Weight Management   AMBULATORY CARE:   Why it is important to manage your weight:  Being overweight increases your risk of health conditions such as heart disease, high blood pressure, type 2 diabetes, and certain types of cancer  It can also increase your risk for osteoarthritis, sleep apnea, and other respiratory problems  Aim for a slow, steady weight loss  Even a small amount of weight loss can lower your risk of health problems  Risks of being overweight:  Extra weight can cause many health problems, including the following:  Diabetes (high blood sugar level)    High blood pressure or high cholesterol    Heart disease    Stroke    Gallbladder or liver disease    Cancer of the colon, breast, prostate, liver, or kidney    Sleep apnea    Arthritis or gout    Screening  is done to check for health conditions before you have signs or symptoms  If you are 28to 79years old, your blood sugar level may be checked every 3 years for signs of prediabetes or diabetes  Your healthcare provider will check your blood pressure at each visit  High blood pressure can lead to a stroke or other problems  Your provider may check for signs of heart disease, cancer, or other health problems  How to lose weight safely:  A safe and healthy way to lose weight is to eat fewer calories and get regular exercise  You can lose up about 1 pound a week by decreasing the number of calories you eat by 500 calories each day  You can decrease calories by eating smaller portion sizes or by cutting out high-calorie foods  Read labels to find out how many calories are in the foods you eat  You can also burn calories with exercise such as walking, swimming, or biking  You will be more likely to keep weight off if you make these changes part of your lifestyle  Exercise at least 30 minutes per day on most days of the week   You can also fit in more physical activity by taking the stairs instead of the elevator or parking farther away from stores  Ask your healthcare provider about the best exercise plan for you  Healthy meal plan for weight management:  A healthy meal plan includes a variety of foods, contains fewer calories, and helps you stay healthy  A healthy meal plan includes the following:     Eat whole-grain foods more often  A healthy meal plan should contain fiber  Fiber is the part of grains, fruits, and vegetables that is not broken down by your body  Whole-grain foods are healthy and provide extra fiber in your diet  Some examples of whole-grain foods are whole-wheat breads and pastas, oatmeal, brown rice, and bulgur  Eat a variety of vegetables every day  Include dark, leafy greens such as spinach, kale, abhishek greens, and mustard greens  Eat yellow and orange vegetables such as carrots, sweet potatoes, and winter squash  Eat a variety of fruits every day  Choose fresh or canned fruit (canned in its own juice or light syrup) instead of juice  Fruit juice has very little or no fiber  Eat low-fat dairy foods  Drink fat-free (skim) milk or 1% milk  Eat fat-free yogurt and low-fat cottage cheese  Try low-fat cheeses such as mozzarella and other reduced-fat cheeses  Choose meat and other protein foods that are low in fat  Choose beans or other legumes such as split peas or lentils  Choose fish, skinless poultry (chicken or turkey), or lean cuts of red meat (beef or pork)  Before you cook meat or poultry, cut off any visible fat  Use less fat and oil  Try baking foods instead of frying them  Add less fat, such as margarine, sour cream, regular salad dressing and mayonnaise to foods  Eat fewer high-fat foods  Some examples of high-fat foods include french fries, doughnuts, ice cream, and cakes  Eat fewer sweets  Limit foods and drinks that are high in sugar  This includes candy, cookies, regular soda, and sweetened drinks  Ways to decrease calories:   Eat smaller portions       Use a small plate with smaller servings  Do not eat second helpings  When you eat at a restaurant, ask for a box and place half of your meal in the box before you eat  Share an entrée with someone else  Replace high-calorie snacks with healthy, low-calorie snacks  Choose fresh fruit, vegetables, fat-free rice cakes, or air-popped popcorn instead of potato chips, nuts, or chocolate  Choose water or calorie-free drinks instead of soda or sweetened drinks  Do not shop for groceries when you are hungry  You may be more likely to make unhealthy food choices  Take a grocery list of healthy foods and shop after you have eaten  Eat regular meals  Do not skip meals  Skipping meals can lead to overeating later in the day  This can make it harder for you to lose weight  Eat a healthy snack in place of a meal if you do not have time to eat a regular meal  Talk with a dietitian to help you create a meal plan and schedule that is right for you  Other things to consider as you try to lose weight:   Be aware of situations that may give you the urge to overeat, such as eating while watching television  Find ways to avoid these situations  For example, read a book, go for a walk, or do crafts  Meet with a weight loss support group or friends who are also trying to lose weight  This may help you stay motivated to continue working on your weight loss goals  © Copyright [facility] 2022 Information is for End User's use only and may not be sold, redistributed or otherwise used for commercial purposes  The above information is an  only  It is not intended as medical advice for individual conditions or treatments  Talk to your doctor, nurse or pharmacist before following any medical regimen to see if it is safe and effective for you

## 2023-07-11 DIAGNOSIS — F90.9 ATTENTION DEFICIT HYPERACTIVITY DISORDER (ADHD), UNSPECIFIED ADHD TYPE: ICD-10-CM

## 2023-07-11 RX ORDER — DEXTROAMPHETAMINE SACCHARATE, AMPHETAMINE ASPARTATE, DEXTROAMPHETAMINE SULFATE AND AMPHETAMINE SULFATE 7.5; 7.5; 7.5; 7.5 MG/1; MG/1; MG/1; MG/1
1 TABLET ORAL DAILY
Qty: 30 TABLET | Refills: 0 | Status: SHIPPED | OUTPATIENT
Start: 2023-07-11 | End: 2023-08-10

## 2023-07-26 ENCOUNTER — APPOINTMENT (OUTPATIENT)
Dept: LAB | Facility: CLINIC | Age: 38
End: 2023-07-26
Payer: COMMERCIAL

## 2023-07-26 ENCOUNTER — OFFICE VISIT (OUTPATIENT)
Dept: FAMILY MEDICINE CLINIC | Facility: CLINIC | Age: 38
End: 2023-07-26
Payer: COMMERCIAL

## 2023-07-26 VITALS
WEIGHT: 160 LBS | SYSTOLIC BLOOD PRESSURE: 92 MMHG | BODY MASS INDEX: 25.71 KG/M2 | DIASTOLIC BLOOD PRESSURE: 60 MMHG | OXYGEN SATURATION: 98 % | HEART RATE: 90 BPM | HEIGHT: 66 IN

## 2023-07-26 DIAGNOSIS — Z13.1 SCREENING FOR DIABETES MELLITUS: ICD-10-CM

## 2023-07-26 DIAGNOSIS — F98.8 ATTENTION DEFICIT DISORDER (ADD) WITHOUT HYPERACTIVITY: Primary | ICD-10-CM

## 2023-07-26 DIAGNOSIS — M79.672 LEFT FOOT PAIN: ICD-10-CM

## 2023-07-26 DIAGNOSIS — Z13.6 SCREENING FOR CARDIOVASCULAR CONDITION: ICD-10-CM

## 2023-07-26 LAB
ALBUMIN SERPL BCP-MCNC: 3.8 G/DL (ref 3.5–5)
ALP SERPL-CCNC: 52 U/L (ref 46–116)
ALT SERPL W P-5'-P-CCNC: 19 U/L (ref 12–78)
ANION GAP SERPL CALCULATED.3IONS-SCNC: 2 MMOL/L
AST SERPL W P-5'-P-CCNC: 15 U/L (ref 5–45)
BILIRUB SERPL-MCNC: 0.42 MG/DL (ref 0.2–1)
BUN SERPL-MCNC: 12 MG/DL (ref 5–25)
CALCIUM SERPL-MCNC: 9 MG/DL (ref 8.3–10.1)
CHLORIDE SERPL-SCNC: 109 MMOL/L (ref 96–108)
CHOLEST SERPL-MCNC: 155 MG/DL
CO2 SERPL-SCNC: 27 MMOL/L (ref 21–32)
CREAT SERPL-MCNC: 0.68 MG/DL (ref 0.6–1.3)
GFR SERPL CREATININE-BSD FRML MDRD: 110 ML/MIN/1.73SQ M
GLUCOSE SERPL-MCNC: 103 MG/DL (ref 65–140)
HDLC SERPL-MCNC: 58 MG/DL
LDLC SERPL CALC-MCNC: 88 MG/DL (ref 0–100)
NONHDLC SERPL-MCNC: 97 MG/DL
POTASSIUM SERPL-SCNC: 4 MMOL/L (ref 3.5–5.3)
PROT SERPL-MCNC: 7.2 G/DL (ref 6.4–8.4)
SODIUM SERPL-SCNC: 138 MMOL/L (ref 135–147)
TRIGL SERPL-MCNC: 45 MG/DL

## 2023-07-26 PROCEDURE — 99213 OFFICE O/P EST LOW 20 MIN: CPT | Performed by: FAMILY MEDICINE

## 2023-07-26 PROCEDURE — 80061 LIPID PANEL: CPT

## 2023-07-26 PROCEDURE — 80053 COMPREHEN METABOLIC PANEL: CPT

## 2023-07-26 PROCEDURE — 36415 COLL VENOUS BLD VENIPUNCTURE: CPT

## 2023-07-26 RX ORDER — DIPHENHYDRAMINE HCL 50 MG
CAPSULE ORAL
COMMUNITY

## 2023-07-26 NOTE — PROGRESS NOTES
Name: Ly Velarde      : 1985      MRN: [id number]  Encounter Provider: Radha Valdez MD  Encounter Date: 2023   Encounter department: [facility]    Assessment & Plan     1. Attention deficit disorder (ADD) without hyperactivity  Assessment & Plan:  Stable on meds, no early refills per  Pdpmp, uds  C/w  rxed  Meds, no side effects      2. Screening for cardiovascular condition  -     Lipid panel; Future    3. Screening for diabetes mellitus  -     Comprehensive metabolic panel; Future    4. Left foot pain         Subjective      F/u add, no side effects  On  Meds, l foot  Hurts where  Dropped  Can , has  Some  Bruising but  cna weight  Bear  And  Move  toes-happened  About 10  Days  ago    Review of Systems   Constitutional: Negative for activity change, appetite change and fatigue. Respiratory: Negative for shortness of breath. Cardiovascular: Negative for chest pain. Musculoskeletal: Positive for arthralgias. L foot pain   Neurological: Negative for dizziness and headaches. Psychiatric/Behavioral: Positive for decreased concentration. Current Outpatient Medications on File Prior to Visit   Medication Sig   • amphetamine-dextroamphetamine (ADDERALL) 30 MG tablet Take 1 tablet (30 mg total) by mouth daily Max Daily Amount: 30 mg   • diphenhydrAMINE (BENADRYL) 50 mg capsule        Objective     BP 92/60 (BP Location: Left arm, Patient Position: Sitting, Cuff Size: Standard)   Pulse 90   Ht 5' 6" (1.676 m)   Wt 72.6 kg (160 lb)   SpO2 98%   BMI 25.82 kg/m²     Physical Exam  Vitals reviewed. Constitutional:       Appearance: Normal appearance. Cardiovascular:      Rate and Rhythm: Normal rate and regular rhythm. Pulses: Normal pulses. Heart sounds: Normal heart sounds. Pulmonary:      Effort: Pulmonary effort is normal.      Breath sounds: Normal breath sounds. Musculoskeletal:      Right lower leg: No edema. Left lower leg: No edema. Lymphadenopathy:      Cervical: No cervical adenopathy. Skin:     Findings: Bruising present. Comments: Where  Can dropped on foot   Neurological:      Mental Status: She is alert.        Mansoor Wahl MD

## 2023-09-12 ENCOUNTER — PATIENT MESSAGE (OUTPATIENT)
Dept: FAMILY MEDICINE CLINIC | Facility: CLINIC | Age: 38
End: 2023-09-12

## 2023-09-25 DIAGNOSIS — F90.9 ATTENTION DEFICIT HYPERACTIVITY DISORDER (ADHD), UNSPECIFIED ADHD TYPE: ICD-10-CM

## 2023-09-26 RX ORDER — DEXTROAMPHETAMINE SACCHARATE, AMPHETAMINE ASPARTATE, DEXTROAMPHETAMINE SULFATE AND AMPHETAMINE SULFATE 7.5; 7.5; 7.5; 7.5 MG/1; MG/1; MG/1; MG/1
1 TABLET ORAL DAILY
Qty: 30 TABLET | Refills: 0 | Status: SHIPPED | OUTPATIENT
Start: 2023-09-26 | End: 2023-10-26

## 2023-10-30 DIAGNOSIS — F90.9 ATTENTION DEFICIT HYPERACTIVITY DISORDER (ADHD), UNSPECIFIED ADHD TYPE: ICD-10-CM

## 2023-10-30 RX ORDER — DEXTROAMPHETAMINE SACCHARATE, AMPHETAMINE ASPARTATE, DEXTROAMPHETAMINE SULFATE AND AMPHETAMINE SULFATE 7.5; 7.5; 7.5; 7.5 MG/1; MG/1; MG/1; MG/1
1 TABLET ORAL DAILY
Qty: 30 TABLET | Refills: 0 | Status: SHIPPED | OUTPATIENT
Start: 2023-10-30 | End: 2023-11-29

## 2023-11-06 ENCOUNTER — HOSPITAL ENCOUNTER (OUTPATIENT)
Dept: MAMMOGRAPHY | Facility: MEDICAL CENTER | Age: 38
Discharge: HOME | End: 2023-11-06
Payer: COMMERCIAL

## 2023-11-06 VITALS — WEIGHT: 160 LBS | HEIGHT: 66 IN | BODY MASS INDEX: 25.71 KG/M2

## 2023-11-06 DIAGNOSIS — Z12.31 ENCOUNTER FOR SCREENING MAMMOGRAM FOR BREAST CANCER: ICD-10-CM

## 2023-11-06 PROCEDURE — 77063 BREAST TOMOSYNTHESIS BI: CPT

## 2023-11-06 PROCEDURE — 77067 SCR MAMMO BI INCL CAD: CPT

## 2023-12-08 DIAGNOSIS — F90.9 ATTENTION DEFICIT HYPERACTIVITY DISORDER (ADHD), UNSPECIFIED ADHD TYPE: ICD-10-CM

## 2023-12-08 RX ORDER — DEXTROAMPHETAMINE SACCHARATE, AMPHETAMINE ASPARTATE, DEXTROAMPHETAMINE SULFATE AND AMPHETAMINE SULFATE 7.5; 7.5; 7.5; 7.5 MG/1; MG/1; MG/1; MG/1
1 TABLET ORAL DAILY
Qty: 30 TABLET | Refills: 0 | Status: SHIPPED | OUTPATIENT
Start: 2023-12-08 | End: 2024-01-07

## 2024-01-16 DIAGNOSIS — F90.9 ATTENTION DEFICIT HYPERACTIVITY DISORDER (ADHD), UNSPECIFIED ADHD TYPE: ICD-10-CM

## 2024-01-16 RX ORDER — DEXTROAMPHETAMINE SACCHARATE, AMPHETAMINE ASPARTATE, DEXTROAMPHETAMINE SULFATE AND AMPHETAMINE SULFATE 7.5; 7.5; 7.5; 7.5 MG/1; MG/1; MG/1; MG/1
1 TABLET ORAL DAILY
Qty: 30 TABLET | Refills: 0 | Status: SHIPPED | OUTPATIENT
Start: 2024-01-16 | End: 2024-02-15

## 2024-01-31 ENCOUNTER — OFFICE VISIT (OUTPATIENT)
Dept: FAMILY MEDICINE CLINIC | Facility: CLINIC | Age: 39
End: 2024-01-31
Payer: COMMERCIAL

## 2024-01-31 VITALS
HEART RATE: 90 BPM | BODY MASS INDEX: 25.42 KG/M2 | WEIGHT: 157.5 LBS | OXYGEN SATURATION: 98 % | DIASTOLIC BLOOD PRESSURE: 74 MMHG | TEMPERATURE: 97.9 F | SYSTOLIC BLOOD PRESSURE: 116 MMHG

## 2024-01-31 DIAGNOSIS — M79.642 HAND PAIN, LEFT: ICD-10-CM

## 2024-01-31 DIAGNOSIS — F98.8 ATTENTION DEFICIT DISORDER (ADD) WITHOUT HYPERACTIVITY: Primary | ICD-10-CM

## 2024-01-31 PROCEDURE — 99213 OFFICE O/P EST LOW 20 MIN: CPT | Performed by: FAMILY MEDICINE

## 2024-01-31 RX ORDER — IBUPROFEN 600 MG/1
600 TABLET ORAL EVERY 8 HOURS PRN
Qty: 30 TABLET | Refills: 2 | Status: SHIPPED | OUTPATIENT
Start: 2024-01-31

## 2024-01-31 NOTE — PROGRESS NOTES
Name: Ruben Adams      : 1985      MRN: [id number]  Encounter Provider: Negrita Kidd MD  Encounter Date: 2024   Encounter department: [facility]    Assessment & Plan     1. Attention deficit disorder (ADD) without hyperactivity  Assessment & Plan:  Up to date on uds and  no early refills  per  pdmp      2. Hand pain, left  -     ibuprofen (MOTRIN) 600 mg tablet; Take 1 tablet (600 mg total) by mouth every 8 (eight) hours as needed for mild pain           Subjective      Patient presents with:  Follow-up: For ADD.  mgb   No  elevated  heart  rate, bp stable, some hand  pain due to playing  more  video games, is  resting  and  using  compression glove, also does  data  entry with l hand      Review of Systems   Constitutional:  Negative for activity change, appetite change and fatigue.   Respiratory:  Negative for shortness of breath.    Cardiovascular:  Negative for chest pain.   Musculoskeletal:  Positive for arthralgias.        L hand pain   Neurological:  Negative for dizziness, light-headedness and headaches.   Psychiatric/Behavioral:  Negative for decreased concentration.        Current Outpatient Medications on File Prior to Visit   Medication Sig    amphetamine-dextroamphetamine (ADDERALL) 30 MG tablet Take 1 tablet (30 mg total) by mouth daily Max Daily Amount: 30 mg    diphenhydrAMINE (BENADRYL) 50 mg capsule        Objective     /74 (BP Location: Left arm, Patient Position: Sitting, Cuff Size: Standard)   Pulse 90   Temp 97.9 °F (36.6 °C) (Temporal)   Wt 71.4 kg (157 lb 8 oz)   SpO2 98%   BMI 25.42 kg/m²     Physical Exam  Vitals reviewed.   Constitutional:       Appearance: Normal appearance.   Cardiovascular:      Rate and Rhythm: Normal rate and regular rhythm.      Pulses: Normal pulses.      Heart sounds: Normal heart sounds.   Pulmonary:      Effort: Pulmonary effort is normal.      Breath sounds: Normal breath sounds.   Musculoskeletal:         General:  Tenderness present.      Right lower leg: No edema.      Left lower leg: No edema.      Comments: L hand with tenderness and swelling   Lymphadenopathy:      Cervical: No cervical adenopathy.   Neurological:      Mental Status: She is alert.       Negrita Kidd MD

## 2024-02-27 DIAGNOSIS — F90.9 ATTENTION DEFICIT HYPERACTIVITY DISORDER (ADHD), UNSPECIFIED ADHD TYPE: ICD-10-CM

## 2024-02-27 RX ORDER — DEXTROAMPHETAMINE SACCHARATE, AMPHETAMINE ASPARTATE, DEXTROAMPHETAMINE SULFATE AND AMPHETAMINE SULFATE 7.5; 7.5; 7.5; 7.5 MG/1; MG/1; MG/1; MG/1
1 TABLET ORAL DAILY
Qty: 30 TABLET | Refills: 0 | Status: SHIPPED | OUTPATIENT
Start: 2024-02-27 | End: 2024-03-28

## 2024-04-02 DIAGNOSIS — F90.9 ATTENTION DEFICIT HYPERACTIVITY DISORDER (ADHD), UNSPECIFIED ADHD TYPE: ICD-10-CM

## 2024-04-02 RX ORDER — DEXTROAMPHETAMINE SACCHARATE, AMPHETAMINE ASPARTATE, DEXTROAMPHETAMINE SULFATE AND AMPHETAMINE SULFATE 7.5; 7.5; 7.5; 7.5 MG/1; MG/1; MG/1; MG/1
1 TABLET ORAL DAILY
Qty: 30 TABLET | Refills: 0 | Status: SHIPPED | OUTPATIENT
Start: 2024-04-02 | End: 2024-05-02

## 2024-05-08 DIAGNOSIS — F90.9 ATTENTION DEFICIT HYPERACTIVITY DISORDER (ADHD), UNSPECIFIED ADHD TYPE: ICD-10-CM

## 2024-05-09 RX ORDER — DEXTROAMPHETAMINE SACCHARATE, AMPHETAMINE ASPARTATE, DEXTROAMPHETAMINE SULFATE AND AMPHETAMINE SULFATE 7.5; 7.5; 7.5; 7.5 MG/1; MG/1; MG/1; MG/1
1 TABLET ORAL DAILY
Qty: 30 TABLET | Refills: 0 | Status: SHIPPED | OUTPATIENT
Start: 2024-05-09 | End: 2024-06-08

## 2024-06-21 DIAGNOSIS — F90.9 ATTENTION DEFICIT HYPERACTIVITY DISORDER (ADHD), UNSPECIFIED ADHD TYPE: ICD-10-CM

## 2024-06-21 RX ORDER — DEXTROAMPHETAMINE SACCHARATE, AMPHETAMINE ASPARTATE, DEXTROAMPHETAMINE SULFATE AND AMPHETAMINE SULFATE 7.5; 7.5; 7.5; 7.5 MG/1; MG/1; MG/1; MG/1
1 TABLET ORAL DAILY
Qty: 30 TABLET | Refills: 0 | Status: SHIPPED | OUTPATIENT
Start: 2024-06-21 | End: 2024-07-21

## 2024-08-07 ENCOUNTER — OFFICE VISIT (OUTPATIENT)
Dept: FAMILY MEDICINE CLINIC | Facility: CLINIC | Age: 39
End: 2024-08-07
Payer: COMMERCIAL

## 2024-08-07 VITALS
TEMPERATURE: 97.4 F | SYSTOLIC BLOOD PRESSURE: 110 MMHG | BODY MASS INDEX: 27.82 KG/M2 | OXYGEN SATURATION: 97 % | WEIGHT: 172.38 LBS | HEART RATE: 91 BPM | DIASTOLIC BLOOD PRESSURE: 78 MMHG

## 2024-08-07 DIAGNOSIS — F90.9 ATTENTION DEFICIT HYPERACTIVITY DISORDER (ADHD), UNSPECIFIED ADHD TYPE: ICD-10-CM

## 2024-08-07 DIAGNOSIS — M25.442 SWELLING OF FINGER JOINT OF LEFT HAND: ICD-10-CM

## 2024-08-07 DIAGNOSIS — Z12.31 ENCOUNTER FOR SCREENING MAMMOGRAM FOR BREAST CANCER: Primary | ICD-10-CM

## 2024-08-07 DIAGNOSIS — F98.8 ATTENTION DEFICIT DISORDER (ADD) WITHOUT HYPERACTIVITY: Primary | ICD-10-CM

## 2024-08-07 DIAGNOSIS — R23.3 EASY BRUISING: ICD-10-CM

## 2024-08-07 PROCEDURE — 80307 DRUG TEST PRSMV CHEM ANLYZR: CPT | Performed by: FAMILY MEDICINE

## 2024-08-07 PROCEDURE — 80359 METHYLENEDIOXYAMPHETAMINES: CPT | Performed by: FAMILY MEDICINE

## 2024-08-07 PROCEDURE — 80324 DRUG SCREEN AMPHETAMINES 1/2: CPT | Performed by: FAMILY MEDICINE

## 2024-08-07 PROCEDURE — 80349 CANNABINOIDS NATURAL: CPT | Performed by: FAMILY MEDICINE

## 2024-08-07 PROCEDURE — 99214 OFFICE O/P EST MOD 30 MIN: CPT | Performed by: FAMILY MEDICINE

## 2024-08-07 RX ORDER — DEXTROAMPHETAMINE SACCHARATE, AMPHETAMINE ASPARTATE, DEXTROAMPHETAMINE SULFATE AND AMPHETAMINE SULFATE 7.5; 7.5; 7.5; 7.5 MG/1; MG/1; MG/1; MG/1
1 TABLET ORAL DAILY
Qty: 30 TABLET | Refills: 0 | Status: SHIPPED | OUTPATIENT
Start: 2024-08-07 | End: 2024-09-06

## 2024-08-07 NOTE — PROGRESS NOTES
Ambulatory Visit  Name: Ruben Adams      : 1985      MRN: [id number]  Encounter Provider: Negrita Kidd MD  Encounter Date: 2024   Encounter department: [facility]    Assessment & Plan   1. Attention deficit disorder (ADD) without hyperactivity  Assessment & Plan:  Bp, hr  normal, has  phenobarbital on pdmp for  dog's seizures, get uds and contract, no concerns per  pdmp  Orders:  -     Drug Screen Routine w /Conf and Adulteration, urine.; Future  2. Easy bruising  -     CBC and differential  -     Protime-INR; Future  -     APTT; Future  3. Attention deficit hyperactivity disorder (ADHD), unspecified ADHD type  -     amphetamine-dextroamphetamine (ADDERALL) 30 MG tablet; Take 1 tablet (30 mg total) by mouth daily Max Daily Amount: 30 mg  4. Swelling of finger joint of left hand  -     C-reactive protein  -     RF Screen w/ Reflex to Titer  -     [place] Screen w/ Reflex to Titer/Pattern; Future  -     Uric acid; Future       History of Present Illness     F/u add, doing  well on meds, bruising  more  easily, no excessive  bleeding , no nosebleeds or  rectal bleeding, having issue  with  index  finger on l is swollen, comes and goes, no hx  bite,gets  very painful,concerned  about  Achenbach  syndrome because  randomly gets  small blood  vessels  popping  and  causing  small hematomas           Review of Systems   Constitutional:  Negative for activity change and appetite change.   Respiratory:  Negative for shortness of breath.    Cardiovascular:  Negative for chest pain.   Musculoskeletal:  Positive for arthralgias and joint swelling.        Finger pain l   Hematological:  Negative for adenopathy. Bruises/bleeds easily.   Psychiatric/Behavioral:  Positive for decreased concentration.        Objective     /78 (BP Location: Right arm, Patient Position: Sitting, Cuff Size: Standard)   Pulse 91   Temp (!) 97.4 °F (36.3 °C) (Temporal)   Wt 78.2 kg (172 lb 6 oz)   SpO2 97%   BMI  To: Care Team 1    Patient was seen in the ER and is in need of a follow up with Dr. Iqbal.  Please return her phone call @ [phone]     Thank you   27.82 kg/m²     Physical Exam  Vitals reviewed.   Constitutional:       Appearance: Normal appearance.   Cardiovascular:      Rate and Rhythm: Normal rate and regular rhythm.      Pulses: Normal pulses.      Heart sounds: Normal heart sounds.   Pulmonary:      Effort: Pulmonary effort is normal.      Breath sounds: Normal breath sounds.   Musculoskeletal:         General: Swelling present.      Comments: L  index  finger swollen   Lymphadenopathy:      Cervical: No cervical adenopathy.   Skin:     Findings: Bruising present.   Neurological:      Mental Status: She is alert.   Psychiatric:         Mood and Affect: Mood normal.       Administrative Statements

## 2024-08-12 ENCOUNTER — TELEPHONE (OUTPATIENT)
Dept: FAMILY MEDICINE CLINIC | Facility: CLINIC | Age: 39
End: 2024-08-12

## 2024-08-12 LAB
6MAM UR QL SCN: NEGATIVE NG/ML
ACCEPTABLE CREAT UR QL: 164 MG/DL
AMPHET UR QL CFM: >6098 NG/MG CREAT
AMPHET UR QL SCN: ABNORMAL NG/ML
BARBITURATES UR QL SCN: NEGATIVE NG/ML
BENZODIAZ UR QL SCN: NEGATIVE NG/ML
BUPRENORPHINE UR QL CFM: NEGATIVE NG/ML
CANNABINOIDS UR QL SCN: ABNORMAL NG/ML
CANNABINOIDS/CREAT UR: 195 NG/MG CREAT
CARISOPRODOL UR QL: NEGATIVE NG/ML
COCAINE+BZE UR QL SCN: NEGATIVE NG/ML
ETHYL GLUCURONIDE UR QL SCN: NEGATIVE NG/ML
FENTANYL UR QL SCN: NEGATIVE NG/ML
GABAPENTIN SERPLBLD QL SCN: NEGATIVE UG/ML
MDMA UR QL CFM: NOT DETECTED NG/MG CREAT
MDMA UR QL CFM: NOT DETECTED NG/MG CREAT
METHADONE UR QL SCN: NEGATIVE NG/ML
METHAMPHET UR QL CFM: NOT DETECTED NG/MG CREAT
NITRITE UR QL STRIP: NEGATIVE UG/ML
OPIATES UR QL SCN: NEGATIVE NG/ML
OXYCODONE+OXYMORPHONE UR QL SCN: NEGATIVE NG/ML
PCP UR QL SCN: NEGATIVE NG/ML
PROPOXYPH UR QL SCN: NEGATIVE NG/ML
SPECIMEN PH ACCEPTABLE UR: 6.3 (ref 4.5–8.9)
TAPENTADOL UR QL SCN: NEGATIVE NG/ML
TRAMADOL UR QL SCN: NEGATIVE NG/ML

## 2024-08-12 NOTE — TELEPHONE ENCOUNTER
----- Message from Negrita Kidd MD sent at 8/12/2024  2:40 PM EDT -----  Urine  consistent with prescribed  meds

## 2024-09-09 DIAGNOSIS — F90.9 ATTENTION DEFICIT HYPERACTIVITY DISORDER (ADHD), UNSPECIFIED ADHD TYPE: ICD-10-CM

## 2024-09-09 RX ORDER — DEXTROAMPHETAMINE SACCHARATE, AMPHETAMINE ASPARTATE, DEXTROAMPHETAMINE SULFATE AND AMPHETAMINE SULFATE 7.5; 7.5; 7.5; 7.5 MG/1; MG/1; MG/1; MG/1
1 TABLET ORAL DAILY
Qty: 30 TABLET | Refills: 0 | Status: SHIPPED | OUTPATIENT
Start: 2024-09-09 | End: 2024-10-09

## 2024-09-09 NOTE — TELEPHONE ENCOUNTER
Patient said Dr. Kidd told her to call today for September refill and October refill since Dr. Kidd will be out for a surgery.     Reason for call:   [x] Refill   [] Prior Auth  [] Other:     Office:   [x] PCP/Provider -   [] Specialty/Provider -     Medication:  amphetamine-dextroamphetamine (ADDERALL) 30 MG tablet     Dose/Frequency: Take 1 tablet (30 mg total) by mouth daily     Quantity: 30 (requesting a 2 month supply)    Pharmacy: [facility]/pharmacy #[id number] - [place] - [address]     Does the patient have enough for 3 days?   [] Yes   [x] No - Send as HP to POD

## 2024-10-08 DIAGNOSIS — F90.9 ATTENTION DEFICIT HYPERACTIVITY DISORDER (ADHD), UNSPECIFIED ADHD TYPE: ICD-10-CM

## 2024-10-09 RX ORDER — DEXTROAMPHETAMINE SACCHARATE, AMPHETAMINE ASPARTATE, DEXTROAMPHETAMINE SULFATE AND AMPHETAMINE SULFATE 7.5; 7.5; 7.5; 7.5 MG/1; MG/1; MG/1; MG/1
1 TABLET ORAL DAILY
Qty: 30 TABLET | Refills: 0 | Status: SHIPPED | OUTPATIENT
Start: 2024-10-09 | End: 2024-11-08

## 2024-11-11 DIAGNOSIS — F90.9 ATTENTION DEFICIT HYPERACTIVITY DISORDER (ADHD), UNSPECIFIED ADHD TYPE: ICD-10-CM

## 2024-11-11 RX ORDER — DEXTROAMPHETAMINE SACCHARATE, AMPHETAMINE ASPARTATE, DEXTROAMPHETAMINE SULFATE AND AMPHETAMINE SULFATE 7.5; 7.5; 7.5; 7.5 MG/1; MG/1; MG/1; MG/1
1 TABLET ORAL DAILY
Qty: 30 TABLET | Refills: 0 | Status: SHIPPED | OUTPATIENT
Start: 2024-11-11 | End: 2024-12-11

## 2024-12-24 DIAGNOSIS — F90.9 ATTENTION DEFICIT HYPERACTIVITY DISORDER (ADHD), UNSPECIFIED ADHD TYPE: ICD-10-CM

## 2024-12-26 RX ORDER — DEXTROAMPHETAMINE SACCHARATE, AMPHETAMINE ASPARTATE, DEXTROAMPHETAMINE SULFATE AND AMPHETAMINE SULFATE 7.5; 7.5; 7.5; 7.5 MG/1; MG/1; MG/1; MG/1
1 TABLET ORAL DAILY
Qty: 30 TABLET | Refills: 0 | Status: SHIPPED | OUTPATIENT
Start: 2024-12-26 | End: 2025-01-25

## 2025-01-27 DIAGNOSIS — F90.9 ATTENTION DEFICIT HYPERACTIVITY DISORDER (ADHD), UNSPECIFIED ADHD TYPE: ICD-10-CM

## 2025-01-28 RX ORDER — DEXTROAMPHETAMINE SACCHARATE, AMPHETAMINE ASPARTATE, DEXTROAMPHETAMINE SULFATE AND AMPHETAMINE SULFATE 7.5; 7.5; 7.5; 7.5 MG/1; MG/1; MG/1; MG/1
1 TABLET ORAL DAILY
Qty: 30 TABLET | Refills: 0 | Status: SHIPPED | OUTPATIENT
Start: 2025-01-28 | End: 2025-02-27

## 2025-01-29 NOTE — ASSESSMENT & PLAN NOTE
Bp, hr  normal, has  phenobarbital on pdmp for  dog's seizures, get uds and contract, no concerns per  pdmp   The following labs were labeled with appropriate pt sticker and tubed to lab:     [x] Blue     [x] Lavender   [] on ice  [x] Green/yellow  [] Green/black [] on ice  [] Perdomo  [] on ice  [x] Yellow  [] Red  [] Pink  [] Type/ Screen  [] ABG  [] VBG    [] COVID-19 swab    [] Rapid  [] PCR  [] Flu swab  [] Peds Viral Panel     [] Urine Sample  [] Fecal Sample  [] Pelvic Cultures  [] Blood Cultures  [] X 2  [] STREP Cultures  [] Wound Cultures

## 2025-03-03 DIAGNOSIS — F90.9 ATTENTION DEFICIT HYPERACTIVITY DISORDER (ADHD), UNSPECIFIED ADHD TYPE: ICD-10-CM

## 2025-03-04 RX ORDER — DEXTROAMPHETAMINE SACCHARATE, AMPHETAMINE ASPARTATE, DEXTROAMPHETAMINE SULFATE AND AMPHETAMINE SULFATE 7.5; 7.5; 7.5; 7.5 MG/1; MG/1; MG/1; MG/1
1 TABLET ORAL DAILY
Qty: 30 TABLET | Refills: 0 | Status: SHIPPED | OUTPATIENT
Start: 2025-03-04 | End: 2025-04-03

## 2025-04-30 DIAGNOSIS — F90.9 ATTENTION DEFICIT HYPERACTIVITY DISORDER (ADHD), UNSPECIFIED ADHD TYPE: ICD-10-CM

## 2025-04-30 RX ORDER — DEXTROAMPHETAMINE SACCHARATE, AMPHETAMINE ASPARTATE, DEXTROAMPHETAMINE SULFATE AND AMPHETAMINE SULFATE 7.5; 7.5; 7.5; 7.5 MG/1; MG/1; MG/1; MG/1
1 TABLET ORAL DAILY
Qty: 30 TABLET | Refills: 0 | Status: SHIPPED | OUTPATIENT
Start: 2025-04-30 | End: 2025-05-30

## 2025-04-30 NOTE — TELEPHONE ENCOUNTER
Requested Prescriptions     Pending Prescriptions Disp Refills    amphetamine-dextroamphetamine (ADDERALL) 30 MG tablet 30 tablet 0     Sig: Take 1 tablet (30 mg total) by mouth daily Max Daily Amount: 30 mg

## 2025-05-29 ENCOUNTER — OFFICE VISIT (OUTPATIENT)
Dept: FAMILY MEDICINE CLINIC | Facility: CLINIC | Age: 40
End: 2025-05-29
Payer: COMMERCIAL

## 2025-05-29 ENCOUNTER — APPOINTMENT (OUTPATIENT)
Dept: LAB | Facility: CLINIC | Age: 40
End: 2025-05-29
Payer: COMMERCIAL

## 2025-05-29 VITALS
BODY MASS INDEX: 28.28 KG/M2 | HEIGHT: 66 IN | DIASTOLIC BLOOD PRESSURE: 76 MMHG | SYSTOLIC BLOOD PRESSURE: 108 MMHG | OXYGEN SATURATION: 97 % | HEART RATE: 97 BPM | WEIGHT: 176 LBS

## 2025-05-29 DIAGNOSIS — F98.8 ATTENTION DEFICIT DISORDER (ADD) WITHOUT HYPERACTIVITY: ICD-10-CM

## 2025-05-29 DIAGNOSIS — Z20.2 STD EXPOSURE: ICD-10-CM

## 2025-05-29 DIAGNOSIS — Z20.2 STD EXPOSURE: Primary | ICD-10-CM

## 2025-05-29 DIAGNOSIS — Z23 ENCOUNTER FOR IMMUNIZATION: ICD-10-CM

## 2025-05-29 PROCEDURE — 99214 OFFICE O/P EST MOD 30 MIN: CPT | Performed by: FAMILY MEDICINE

## 2025-05-29 PROCEDURE — 87529 HSV DNA AMP PROBE: CPT

## 2025-05-29 PROCEDURE — 36415 COLL VENOUS BLD VENIPUNCTURE: CPT

## 2025-05-29 PROCEDURE — 90651 9VHPV VACCINE 2/3 DOSE IM: CPT | Performed by: FAMILY MEDICINE

## 2025-05-29 PROCEDURE — 90471 IMMUNIZATION ADMIN: CPT | Performed by: FAMILY MEDICINE

## 2025-05-29 PROCEDURE — 87491 CHLMYD TRACH DNA AMP PROBE: CPT

## 2025-05-29 PROCEDURE — 86780 TREPONEMA PALLIDUM: CPT

## 2025-05-29 PROCEDURE — 87389 HIV-1 AG W/HIV-1&-2 AB AG IA: CPT

## 2025-05-29 PROCEDURE — 87591 N.GONORRHOEAE DNA AMP PROB: CPT

## 2025-05-29 NOTE — PROGRESS NOTES
"Name: Ruben Adams      : 1985      MRN: [id number]  Encounter Provider: Negrita Kidd MD  Encounter Date: 2025   Encounter department: [facility]  :  Assessment & Plan  Encounter for immunization    Orders:    HPV VACCINE 9 VALENT IM    STD exposure  Needs std testing, start hpv  series,pap  with next ov  Attention deficit disorder (ADD) without hyperactivity  Stable on meds, needs uds  in                 History of Present Illness   Patient presents with:  Follow-up: Pt present today for her 6 month follow up and to get a lab script for STD and HIV testing ,  was  outside  marital  relationship over  several years with  several other  partners         Review of Systems   Constitutional:  Negative for activity change and appetite change.   Respiratory:  Negative for shortness of breath.    Cardiovascular:  Negative for chest pain.   Genitourinary:  Negative for difficulty urinating, dysuria, pelvic pain and vaginal discharge.   Neurological:  Negative for dizziness, light-headedness and headaches.   Hematological:  Negative for adenopathy.       Objective   /76 (BP Location: Right arm, Patient Position: Sitting, Cuff Size: Large)   Pulse 97   Ht 5' 6\" (1.676 m)   Wt 79.8 kg (176 lb)   SpO2 97%   BMI 28.41 kg/m²      Physical Exam  Vitals reviewed.   Constitutional:       Appearance: Normal appearance.     Cardiovascular:      Rate and Rhythm: Normal rate and regular rhythm.      Pulses: Normal pulses.      Heart sounds: Normal heart sounds.   Pulmonary:      Effort: Pulmonary effort is normal.      Breath sounds: Normal breath sounds.     Musculoskeletal:      Right lower leg: No edema.      Left lower leg: No edema.   Lymphadenopathy:      Cervical: No cervical adenopathy.     Neurological:      Mental Status: She is alert.     Psychiatric:         Mood and Affect: Mood normal.         "

## 2025-05-30 ENCOUNTER — RESULTS FOLLOW-UP (OUTPATIENT)
Dept: FAMILY MEDICINE CLINIC | Facility: CLINIC | Age: 40
End: 2025-05-30

## 2025-05-30 LAB
C TRACH DNA SPEC QL NAA+PROBE: NEGATIVE
HIV 1+2 AB+HIV1 P24 AG SERPL QL IA: NORMAL
N GONORRHOEA DNA SPEC QL NAA+PROBE: NEGATIVE
TREPONEMA PALLIDUM IGG+IGM AB [PRESENCE] IN SERUM OR PLASMA BY IMMUNOASSAY: NORMAL

## 2025-05-30 NOTE — TELEPHONE ENCOUNTER
Patient calling because someone called her from the office but it was a bad connection so she hung up. Told her it looks like someone called her about lab results. Read Dr. Kidd's note to her: Hiv and syphilis tesT NEGATIVE .      Patient said that she is still waiting on some results. Advised that other labs were not back yet.

## 2025-06-02 LAB
HSV1 DNA SPEC QL NAA+PROBE: NEGATIVE
HSV2 DNA SPEC QL NAA+PROBE: NEGATIVE

## 2025-06-05 DIAGNOSIS — M79.642 HAND PAIN, LEFT: ICD-10-CM

## 2025-06-05 DIAGNOSIS — F90.9 ATTENTION DEFICIT HYPERACTIVITY DISORDER (ADHD), UNSPECIFIED ADHD TYPE: ICD-10-CM

## 2025-06-06 RX ORDER — IBUPROFEN 600 MG/1
600 TABLET, FILM COATED ORAL EVERY 8 HOURS PRN
Qty: 30 TABLET | Refills: 0 | Status: SHIPPED | OUTPATIENT
Start: 2025-06-06

## 2025-06-06 RX ORDER — DEXTROAMPHETAMINE SACCHARATE, AMPHETAMINE ASPARTATE, DEXTROAMPHETAMINE SULFATE AND AMPHETAMINE SULFATE 7.5; 7.5; 7.5; 7.5 MG/1; MG/1; MG/1; MG/1
1 TABLET ORAL DAILY
Qty: 30 TABLET | Refills: 0 | Status: SHIPPED | OUTPATIENT
Start: 2025-06-06 | End: 2025-07-06

## 2025-06-06 NOTE — TELEPHONE ENCOUNTER
Requested Prescriptions     Pending Prescriptions Disp Refills    amphetamine-dextroamphetamine (ADDERALL) 30 MG tablet 30 tablet 0     Sig: Take 1 tablet (30 mg total) by mouth daily Max Daily Amount: 30 mg     Signed Prescriptions Disp Refills    ibuprofen (MOTRIN) 600 mg tablet 30 tablet 0     Sig: Take 1 tablet (600 mg total) by mouth every 8 (eight) hours as needed for mild pain     Authorizing Provider: CHRISS GERARD     Ordering User: BURKE FOY

## 2025-06-25 ENCOUNTER — HOSPITAL ENCOUNTER (OUTPATIENT)
Dept: MAMMOGRAPHY | Facility: MEDICAL CENTER | Age: 40
Discharge: HOME | End: 2025-06-25
Payer: COMMERCIAL

## 2025-06-25 VITALS — WEIGHT: 166 LBS | HEIGHT: 66 IN | BODY MASS INDEX: 26.68 KG/M2

## 2025-06-25 DIAGNOSIS — Z12.31 ENCOUNTER FOR SCREENING MAMMOGRAM FOR BREAST CANCER: ICD-10-CM

## 2025-06-25 PROCEDURE — 77063 BREAST TOMOSYNTHESIS BI: CPT

## 2025-06-25 PROCEDURE — 77067 SCR MAMMO BI INCL CAD: CPT

## 2025-07-01 ENCOUNTER — TELEPHONE (OUTPATIENT)
Dept: MAMMOGRAPHY | Facility: CLINIC | Age: 40
End: 2025-07-01

## 2025-07-01 NOTE — TELEPHONE ENCOUNTER
Attempted to schedule diagnostic breast imaging per recommendation from screening mammogram. Patient stated she is not at high risk for breast cancer and her insurance company would not cover the imaging therefore she will not pursue any additional imaging.

## 2025-07-07 DIAGNOSIS — F90.9 ATTENTION DEFICIT HYPERACTIVITY DISORDER (ADHD), UNSPECIFIED ADHD TYPE: ICD-10-CM

## 2025-07-08 DIAGNOSIS — Z30.011 ENCOUNTER FOR INITIAL PRESCRIPTION OF CONTRACEPTIVE PILLS: Primary | ICD-10-CM

## 2025-07-08 RX ORDER — NORETHINDRONE ACETATE AND ETHINYL ESTRADIOL .02; 1 MG/1; MG/1
1 TABLET ORAL DAILY
Qty: 28 TABLET | Refills: 3 | Status: SHIPPED | OUTPATIENT
Start: 2025-07-08

## 2025-07-08 RX ORDER — DEXTROAMPHETAMINE SACCHARATE, AMPHETAMINE ASPARTATE, DEXTROAMPHETAMINE SULFATE AND AMPHETAMINE SULFATE 7.5; 7.5; 7.5; 7.5 MG/1; MG/1; MG/1; MG/1
1 TABLET ORAL DAILY
Qty: 30 TABLET | Refills: 0 | Status: SHIPPED | OUTPATIENT
Start: 2025-07-08 | End: 2025-08-07

## 2025-07-16 ENCOUNTER — OFFICE VISIT (OUTPATIENT)
Dept: FAMILY MEDICINE CLINIC | Facility: CLINIC | Age: 40
End: 2025-07-16
Payer: COMMERCIAL

## 2025-07-16 VITALS
HEIGHT: 66 IN | HEART RATE: 104 BPM | SYSTOLIC BLOOD PRESSURE: 110 MMHG | WEIGHT: 161.8 LBS | BODY MASS INDEX: 26 KG/M2 | DIASTOLIC BLOOD PRESSURE: 72 MMHG | OXYGEN SATURATION: 99 %

## 2025-07-16 DIAGNOSIS — M60.9 MYOSITIS OF LEFT UPPER ARM, UNSPECIFIED MYOSITIS TYPE: Primary | ICD-10-CM

## 2025-07-16 PROCEDURE — 99213 OFFICE O/P EST LOW 20 MIN: CPT | Performed by: FAMILY MEDICINE

## 2025-07-16 RX ORDER — PREDNISONE 20 MG/1
20 TABLET ORAL 2 TIMES DAILY WITH MEALS
Qty: 10 TABLET | Refills: 0 | Status: SHIPPED | OUTPATIENT
Start: 2025-07-16 | End: 2025-07-21

## 2025-07-16 NOTE — PROGRESS NOTES
"Name: Ruben Adams      : 1985      MRN: [id number]  Encounter Provider: Negrita Kidd MD  Encounter Date: 2025   Encounter department: [facility]IMARY [facility]  :  Assessment & Plan  Myositis of left upper arm, unspecified myositis type  Prednisone  course, check us, if  no improvement  then proceed  to MRI, use  ice  as  much as possible as well  as Voltaren gel  Orders:    US extremity soft tissue; Future    predniSONE 20 mg tablet; Take 1 tablet (20 mg total) by mouth 2 (two) times a day with meals for 5 days    Diclofenac Sodium (Voltaren) 1 %; Apply 2 g topically 4 (four) times a day           History of Present Illness   Patient presents with:  Shoulder Pain: Ever since she got HPV vaccine here on 23   Stressed with divorce, blew  out  a tire, can't even apply  deodorant  to  l  arm, sx  are  getting  worse,  taking Motrin with some  relief      Review of Systems   Constitutional:  Negative for activity change, appetite change and fatigue.   Respiratory:  Negative for shortness of breath.    Cardiovascular:  Negative for chest pain.   Musculoskeletal:  Positive for myalgias.        L arm swelling   Hematological:  Negative for adenopathy.       Objective   /72 (BP Location: Right arm, Patient Position: Sitting, Cuff Size: Adult)   Pulse 104   Ht 5' 6\" (1.676 m)   Wt 73.4 kg (161 lb 12.8 oz)   LMP 2025 (Exact Date)   SpO2 99%   BMI 26.12 kg/m²      Physical Exam  Vitals reviewed.   Constitutional:       Appearance: Normal appearance.     Cardiovascular:      Rate and Rhythm: Normal rate and regular rhythm.      Pulses: Normal pulses.      Heart sounds: Normal heart sounds.   Pulmonary:      Effort: Pulmonary effort is normal.      Breath sounds: Normal breath sounds.     Musculoskeletal:         General: Tenderness and deformity present.      Comments: L  deltoid  swollen and  tender   Lymphadenopathy:      Cervical: No cervical adenopathy.     Neurological:      " Mental Status: She is alert.     Psychiatric:         Mood and Affect: Mood is anxious.

## 2025-07-16 NOTE — ASSESSMENT & PLAN NOTE
Prednisone  course, check us, if  no improvement  then proceed  to MRI, use  ice  as  much as possible as well  as Voltaren gel  Orders:    US extremity soft tissue; Future    predniSONE 20 mg tablet; Take 1 tablet (20 mg total) by mouth 2 (two) times a day with meals for 5 days    Diclofenac Sodium (Voltaren) 1 %; Apply 2 g topically 4 (four) times a day

## 2025-07-30 DIAGNOSIS — Z30.011 ENCOUNTER FOR INITIAL PRESCRIPTION OF CONTRACEPTIVE PILLS: ICD-10-CM

## 2025-07-31 RX ORDER — NORETHINDRONE ACETATE AND ETHINYL ESTRADIOL 20; 1 UG/1; MG/1
1 TABLET ORAL DAILY
Qty: 84 TABLET | Refills: 2 | Status: SHIPPED | OUTPATIENT
Start: 2025-07-31

## 2025-08-06 ENCOUNTER — HOSPITAL ENCOUNTER (OUTPATIENT)
Dept: ULTRASOUND IMAGING | Facility: MEDICAL CENTER | Age: 40
Discharge: HOME | End: 2025-08-06
Attending: FAMILY MEDICINE
Payer: COMMERCIAL

## 2025-08-06 DIAGNOSIS — M60.9 MYOSITIS OF LEFT UPPER ARM, UNSPECIFIED MYOSITIS TYPE: ICD-10-CM

## 2025-08-06 PROCEDURE — 76882 US LMTD JT/FCL EVL NVASC XTR: CPT

## (undated) DEVICE — INTENDED FOR TISSUE SEPARATION, AND OTHER PROCEDURES THAT REQUIRE A SHARP SURGICAL BLADE TO PUNCTURE OR CUT.: Brand: BARD-PARKER SAFETY BLADES SIZE 15, STERILE

## (undated) DEVICE — GLOVE SRG BIOGEL 6.5

## (undated) DEVICE — GLOVE INDICATOR PI UNDERGLOVE SZ 6.5 BLUE

## (undated) DEVICE — SUT VICRYL 2-0 SH 27 IN UNDYED J417H

## (undated) DEVICE — SUT MONOCRYL 4-0 PS-2 27 IN Y426H

## (undated) DEVICE — SYRINGE 10ML LL

## (undated) DEVICE — 3M™ STERI-STRIP™ REINFORCED ADHESIVE SKIN CLOSURES, R1547, 1/2 IN X 4 IN (12 MM X 100 MM), 6 STRIPS/ENVELOPE: Brand: 3M™ STERI-STRIP™

## (undated) DEVICE — SUT VICRYL 3-0 SH 27 IN J416H

## (undated) DEVICE — 3M™ STERI-STRIP™ COMPOUND BENZOIN TINCTURE 40 BAGS/CARTON 4 CARTONS/CASE C1544: Brand: 3M™ STERI-STRIP™

## (undated) DEVICE — SUPER SPONGES,MEDIUM: Brand: KERLIX

## (undated) DEVICE — 2963 MEDIPORE SOFT CLOTH TAPE 3 IN X 10 YD 12 RLS/CS: Brand: 3M™ MEDIPORE™

## (undated) DEVICE — CAUTERY TIP POLISHER: Brand: DEVON

## (undated) DEVICE — BETHLEHEM UNIVERSAL MINOR GEN: Brand: CARDINAL HEALTH

## (undated) DEVICE — 2000CC GUARDIAN II: Brand: GUARDIAN

## (undated) DEVICE — SCD SEQUENTIAL COMPRESSION COMFORT SLEEVE MEDIUM KNEE LENGTH: Brand: KENDALL SCD

## (undated) DEVICE — CHLORAPREP HI-LITE 26ML ORANGE

## (undated) DEVICE — PLUMEPEN PRO 10FT

## (undated) DEVICE — INSULATED NEEDLE ELECTRODE: Brand: EDGE

## (undated) DEVICE — GLOVE SRG BIOGEL 7

## (undated) DEVICE — GAUZE SPONGES,16 PLY: Brand: CURITY

## (undated) DEVICE — GLOVE INDICATOR PI UNDERGLOVE SZ 7 BLUE

## (undated) DEVICE — NEEDLE 25G X 1 1/2